# Patient Record
Sex: MALE | Race: WHITE | NOT HISPANIC OR LATINO | Employment: OTHER | URBAN - METROPOLITAN AREA
[De-identification: names, ages, dates, MRNs, and addresses within clinical notes are randomized per-mention and may not be internally consistent; named-entity substitution may affect disease eponyms.]

---

## 2023-03-21 LAB
ALANINE AMINOTRANSFERASE (SGPT) (U/L) IN SER/PLAS: 28 U/L (ref 10–52)
ALBUMIN (G/DL) IN SER/PLAS: 4.4 G/DL (ref 3.4–5)
ALKALINE PHOSPHATASE (U/L) IN SER/PLAS: 68 U/L (ref 33–136)
ASPARTATE AMINOTRANSFERASE (SGOT) (U/L) IN SER/PLAS: 33 U/L (ref 9–39)
BILIRUBIN DIRECT (MG/DL) IN SER/PLAS: 0.3 MG/DL (ref 0–0.3)
BILIRUBIN TOTAL (MG/DL) IN SER/PLAS: 1.3 MG/DL (ref 0–1.2)
CHOLESTEROL (MG/DL) IN SER/PLAS: 142 MG/DL (ref 0–199)
CHOLESTEROL IN HDL (MG/DL) IN SER/PLAS: 79.1 MG/DL
CHOLESTEROL/HDL RATIO: 1.8
CREATINE KINASE (U/L) IN SER/PLAS: 195 U/L (ref 0–325)
LDL: 46 MG/DL (ref 0–99)
PROTEIN TOTAL: 7.3 G/DL (ref 6.4–8.2)
TRIGLYCERIDE (MG/DL) IN SER/PLAS: 85 MG/DL (ref 0–149)
VLDL: 17 MG/DL (ref 0–40)

## 2023-05-17 LAB
ANION GAP IN SER/PLAS: 13 MMOL/L (ref 10–20)
BASOPHILS (10*3/UL) IN BLOOD BY AUTOMATED COUNT: 0.03 X10E9/L (ref 0–0.1)
BASOPHILS/100 LEUKOCYTES IN BLOOD BY AUTOMATED COUNT: 0.4 % (ref 0–2)
CALCIUM (MG/DL) IN SER/PLAS: 10.1 MG/DL (ref 8.6–10.6)
CARBON DIOXIDE, TOTAL (MMOL/L) IN SER/PLAS: 30 MMOL/L (ref 21–32)
CHLORIDE (MMOL/L) IN SER/PLAS: 100 MMOL/L (ref 98–107)
CREATININE (MG/DL) IN SER/PLAS: 1.02 MG/DL (ref 0.5–1.3)
EOSINOPHILS (10*3/UL) IN BLOOD BY AUTOMATED COUNT: 0.09 X10E9/L (ref 0–0.4)
EOSINOPHILS/100 LEUKOCYTES IN BLOOD BY AUTOMATED COUNT: 1.3 % (ref 0–6)
ERYTHROCYTE DISTRIBUTION WIDTH (RATIO) BY AUTOMATED COUNT: 13.2 % (ref 11.5–14.5)
ERYTHROCYTE MEAN CORPUSCULAR HEMOGLOBIN CONCENTRATION (G/DL) BY AUTOMATED: 32.8 G/DL (ref 32–36)
ERYTHROCYTE MEAN CORPUSCULAR VOLUME (FL) BY AUTOMATED COUNT: 96 FL (ref 80–100)
ERYTHROCYTES (10*6/UL) IN BLOOD BY AUTOMATED COUNT: 4.4 X10E12/L (ref 4.5–5.9)
GFR MALE: 72 ML/MIN/1.73M2
GLUCOSE (MG/DL) IN SER/PLAS: 92 MG/DL (ref 74–99)
HEMATOCRIT (%) IN BLOOD BY AUTOMATED COUNT: 42.4 % (ref 41–52)
HEMOGLOBIN (G/DL) IN BLOOD: 13.9 G/DL (ref 13.5–17.5)
IMMATURE GRANULOCYTES/100 LEUKOCYTES IN BLOOD BY AUTOMATED COUNT: 0.3 % (ref 0–0.9)
LEUKOCYTES (10*3/UL) IN BLOOD BY AUTOMATED COUNT: 6.8 X10E9/L (ref 4.4–11.3)
LYMPHOCYTES (10*3/UL) IN BLOOD BY AUTOMATED COUNT: 1.55 X10E9/L (ref 0.8–3)
LYMPHOCYTES/100 LEUKOCYTES IN BLOOD BY AUTOMATED COUNT: 22.9 % (ref 13–44)
MONOCYTES (10*3/UL) IN BLOOD BY AUTOMATED COUNT: 0.8 X10E9/L (ref 0.05–0.8)
MONOCYTES/100 LEUKOCYTES IN BLOOD BY AUTOMATED COUNT: 11.8 % (ref 2–10)
NEUTROPHILS (10*3/UL) IN BLOOD BY AUTOMATED COUNT: 4.27 X10E9/L (ref 1.6–5.5)
NEUTROPHILS/100 LEUKOCYTES IN BLOOD BY AUTOMATED COUNT: 63.3 % (ref 40–80)
NRBC (PER 100 WBCS) BY AUTOMATED COUNT: 0 /100 WBC (ref 0–0)
PLATELETS (10*3/UL) IN BLOOD AUTOMATED COUNT: 229 X10E9/L (ref 150–450)
POTASSIUM (MMOL/L) IN SER/PLAS: 4.2 MMOL/L (ref 3.5–5.3)
SODIUM (MMOL/L) IN SER/PLAS: 139 MMOL/L (ref 136–145)
UREA NITROGEN (MG/DL) IN SER/PLAS: 26 MG/DL (ref 6–23)

## 2023-05-19 ENCOUNTER — HOSPITAL ENCOUNTER (OUTPATIENT)
Dept: DATA CONVERSION | Facility: HOSPITAL | Age: 83
End: 2023-05-19
Attending: INTERNAL MEDICINE | Admitting: INTERNAL MEDICINE
Payer: COMMERCIAL

## 2023-05-19 DIAGNOSIS — I48.91 UNSPECIFIED ATRIAL FIBRILLATION (MULTI): ICD-10-CM

## 2023-05-19 DIAGNOSIS — Z79.01 LONG TERM (CURRENT) USE OF ANTICOAGULANTS: ICD-10-CM

## 2023-05-19 DIAGNOSIS — I48.0 PAROXYSMAL ATRIAL FIBRILLATION (MULTI): ICD-10-CM

## 2023-05-19 DIAGNOSIS — D50.9 IRON DEFICIENCY ANEMIA, UNSPECIFIED: ICD-10-CM

## 2023-05-19 DIAGNOSIS — Z79.82 LONG TERM (CURRENT) USE OF ASPIRIN: ICD-10-CM

## 2023-05-19 DIAGNOSIS — Z87.891 PERSONAL HISTORY OF NICOTINE DEPENDENCE: ICD-10-CM

## 2023-05-19 DIAGNOSIS — E78.5 HYPERLIPIDEMIA, UNSPECIFIED: ICD-10-CM

## 2023-05-19 DIAGNOSIS — I10 ESSENTIAL (PRIMARY) HYPERTENSION: ICD-10-CM

## 2023-05-19 DIAGNOSIS — J43.9 EMPHYSEMA, UNSPECIFIED (MULTI): ICD-10-CM

## 2023-05-25 LAB
ATRIAL RATE: 300 BPM
ATRIAL RATE: 62 BPM
P AXIS: 78 DEGREES
P OFFSET: 185 MS
P ONSET: 140 MS
PR INTERVAL: 170 MS
Q ONSET: 225 MS
Q ONSET: 226 MS
QRS COUNT: 10 BEATS
QRS COUNT: 17 BEATS
QRS DURATION: 88 MS
QRS DURATION: 88 MS
QT INTERVAL: 314 MS
QT INTERVAL: 430 MS
QTC CALCULATION(BAZETT): 411 MS
QTC CALCULATION(BAZETT): 436 MS
QTC FREDERICIA: 376 MS
QTC FREDERICIA: 435 MS
R AXIS: 21 DEGREES
R AXIS: 48 DEGREES
T AXIS: 60 DEGREES
T AXIS: 71 DEGREES
T OFFSET: 383 MS
T OFFSET: 440 MS
VENTRICULAR RATE: 103 BPM
VENTRICULAR RATE: 62 BPM

## 2023-07-20 ENCOUNTER — TELEPHONE (OUTPATIENT)
Dept: PRIMARY CARE | Facility: CLINIC | Age: 83
End: 2023-07-20
Payer: COMMERCIAL

## 2023-07-20 DIAGNOSIS — J42 CHRONIC BRONCHITIS, UNSPECIFIED CHRONIC BRONCHITIS TYPE (MULTI): Primary | ICD-10-CM

## 2023-07-20 RX ORDER — ALBUTEROL SULFATE 90 UG/1
2 AEROSOL, METERED RESPIRATORY (INHALATION) EVERY 4 HOURS PRN
COMMUNITY
End: 2023-07-20 | Stop reason: SDUPTHER

## 2023-07-20 RX ORDER — ALBUTEROL SULFATE 90 UG/1
2 AEROSOL, METERED RESPIRATORY (INHALATION) EVERY 4 HOURS PRN
Qty: 18 G | Refills: 3 | Status: SHIPPED | OUTPATIENT
Start: 2023-07-20

## 2023-07-20 NOTE — TELEPHONE ENCOUNTER
Rx Refill Request Telephone Encounter    Name:  Connor Gutierres  :  [id number]  Medication Name:  stiolto respimat  Dose : 2.5-2.5  Route : inhalent   Directions : inhail two puffs every 24 hours  Specific Pharmacy location:  [facility] [address]  Best number to reach patient:  [phone]

## 2024-03-14 ENCOUNTER — TELEPHONE (OUTPATIENT)
Dept: PRIMARY CARE | Facility: CLINIC | Age: 84
End: 2024-03-14
Payer: COMMERCIAL

## 2024-03-14 DIAGNOSIS — R06.00 DYSPNEA, UNSPECIFIED TYPE: Primary | ICD-10-CM

## 2024-03-14 NOTE — TELEPHONE ENCOUNTER
Rx Refill Request Telephone Encounter    Name:  Connor Gutierres  :  [id number]  Medication Name:  stiolto Respimat  Dose : 2.5 mcg/2.5 mcg  Directions : inhale 2 puffs every 24 hours.   Specific Pharmacy location:  [facility]  Best number to reach patient:  [phone]

## 2024-03-18 NOTE — TELEPHONE ENCOUNTER
Patient would like to know if you can call in a alternative for Ipratropium-albuterol ? He doesn't believe that it is helping and also a little costly.

## 2024-03-21 ENCOUNTER — OFFICE VISIT (OUTPATIENT)
Dept: PRIMARY CARE | Facility: CLINIC | Age: 84
End: 2024-03-21
Payer: COMMERCIAL

## 2024-03-21 VITALS
BODY MASS INDEX: 19.33 KG/M2 | HEART RATE: 130 BPM | HEIGHT: 70 IN | OXYGEN SATURATION: 93 % | DIASTOLIC BLOOD PRESSURE: 72 MMHG | TEMPERATURE: 98 F | WEIGHT: 135 LBS | SYSTOLIC BLOOD PRESSURE: 109 MMHG

## 2024-03-21 DIAGNOSIS — J42 CHRONIC BRONCHITIS, UNSPECIFIED CHRONIC BRONCHITIS TYPE (MULTI): Primary | ICD-10-CM

## 2024-03-21 PROCEDURE — 1159F MED LIST DOCD IN RCRD: CPT | Performed by: FAMILY MEDICINE

## 2024-03-21 PROCEDURE — 99214 OFFICE O/P EST MOD 30 MIN: CPT | Performed by: FAMILY MEDICINE

## 2024-03-21 PROCEDURE — 1160F RVW MEDS BY RX/DR IN RCRD: CPT | Performed by: FAMILY MEDICINE

## 2024-03-21 PROCEDURE — 1036F TOBACCO NON-USER: CPT | Performed by: FAMILY MEDICINE

## 2024-03-21 RX ORDER — ROSUVASTATIN CALCIUM 20 MG/1
TABLET, COATED ORAL
COMMUNITY
Start: 2022-02-01

## 2024-03-21 RX ORDER — TIOTROPIUM BROMIDE AND OLODATEROL 3.124; 2.736 UG/1; UG/1
SPRAY, METERED RESPIRATORY (INHALATION)
COMMUNITY
Start: 2022-01-19 | End: 2024-03-21 | Stop reason: SDUPTHER

## 2024-03-21 RX ORDER — LOSARTAN POTASSIUM AND HYDROCHLOROTHIAZIDE 12.5; 5 MG/1; MG/1
TABLET ORAL
COMMUNITY
Start: 2022-05-04

## 2024-03-21 RX ORDER — TIOTROPIUM BROMIDE AND OLODATEROL 3.124; 2.736 UG/1; UG/1
2 SPRAY, METERED RESPIRATORY (INHALATION) DAILY
Qty: 2 G | Refills: 3 | Status: SHIPPED | OUTPATIENT
Start: 2024-03-21 | End: 2024-05-20

## 2024-03-21 RX ORDER — NITROGLYCERIN 0.4 MG/1
TABLET SUBLINGUAL
COMMUNITY
Start: 2020-10-16

## 2024-03-21 RX ORDER — NAPROXEN SODIUM 220 MG/1
TABLET, FILM COATED ORAL
COMMUNITY
Start: 2022-07-12

## 2024-03-21 RX ORDER — APIXABAN 2.5 MG/1
TABLET, FILM COATED ORAL
COMMUNITY

## 2024-03-21 RX ORDER — METOPROLOL TARTRATE 25 MG/1
TABLET, FILM COATED ORAL
COMMUNITY
Start: 2017-06-19

## 2024-03-21 ASSESSMENT — PATIENT HEALTH QUESTIONNAIRE - PHQ9
2. FEELING DOWN, DEPRESSED OR HOPELESS: NOT AT ALL
1. LITTLE INTEREST OR PLEASURE IN DOING THINGS: NOT AT ALL
SUM OF ALL RESPONSES TO PHQ9 QUESTIONS 1 AND 2: 0

## 2024-03-21 ASSESSMENT — COLUMBIA-SUICIDE SEVERITY RATING SCALE - C-SSRS
1. IN THE PAST MONTH, HAVE YOU WISHED YOU WERE DEAD OR WISHED YOU COULD GO TO SLEEP AND NOT WAKE UP?: NO
2. HAVE YOU ACTUALLY HAD ANY THOUGHTS OF KILLING YOURSELF?: NO
6. HAVE YOU EVER DONE ANYTHING, STARTED TO DO ANYTHING, OR PREPARED TO DO ANYTHING TO END YOUR LIFE?: NO

## 2024-03-21 ASSESSMENT — ENCOUNTER SYMPTOMS
COUGH: 1
SHORTNESS OF BREATH: 1

## 2024-03-21 NOTE — PROGRESS NOTES
"Subjective   Patient ID: Connor Gutierres is a 83 y.o. male who presents for Med Refill (Pt is here for medication refill ).    COPD, needs inhaler refilled.    Med Refill  Associated symptoms include coughing.        Review of Systems   Respiratory:  Positive for cough and shortness of breath.    All other systems reviewed and are negative.      Objective   /72   Pulse (!) 130   Temp 36.7 °C (98 °F)   Ht 1.778 m (5' 10\")   Wt 61.2 kg (135 lb)   SpO2 93%   BMI 19.37 kg/m²     Physical Exam  Vitals and nursing note reviewed.   Constitutional:       Appearance: Normal appearance.   HENT:      Head: Normocephalic and atraumatic.      Nose: Nose normal.      Mouth/Throat:      Mouth: Mucous membranes are moist.      Pharynx: Oropharynx is clear.   Eyes:      Extraocular Movements: Extraocular movements intact.      Conjunctiva/sclera: Conjunctivae normal.      Pupils: Pupils are equal, round, and reactive to light.   Cardiovascular:      Rate and Rhythm: Normal rate and regular rhythm.      Pulses: Normal pulses.   Pulmonary:      Effort: Pulmonary effort is normal.      Breath sounds: Wheezing present.   Abdominal:      General: Bowel sounds are normal.      Palpations: Abdomen is soft.   Musculoskeletal:         General: Normal range of motion.      Cervical back: Normal range of motion and neck supple.   Skin:     General: Skin is warm and dry.      Capillary Refill: Capillary refill takes less than 2 seconds.   Neurological:      General: No focal deficit present.      Mental Status: He is alert.   Psychiatric:         Mood and Affect: Mood normal.         Behavior: Behavior normal.         Thought Content: Thought content normal.         Judgment: Judgment normal.         Assessment/Plan          "

## 2024-03-25 ENCOUNTER — LAB (OUTPATIENT)
Dept: LAB | Facility: LAB | Age: 84
End: 2024-03-25
Payer: COMMERCIAL

## 2024-03-25 DIAGNOSIS — I10 ESSENTIAL (PRIMARY) HYPERTENSION: ICD-10-CM

## 2024-03-25 DIAGNOSIS — I70.211 ATHEROSCLEROSIS OF NATIVE ARTERIES OF EXTREMITIES WITH INTERMITTENT CLAUDICATION, RIGHT LEG (CMS-HCC): Primary | ICD-10-CM

## 2024-03-25 DIAGNOSIS — E78.5 HYPERLIPIDEMIA, UNSPECIFIED: ICD-10-CM

## 2024-03-25 PROCEDURE — 36415 COLL VENOUS BLD VENIPUNCTURE: CPT

## 2024-03-25 PROCEDURE — 80053 COMPREHEN METABOLIC PANEL: CPT

## 2024-03-25 PROCEDURE — 80061 LIPID PANEL: CPT

## 2024-03-26 ENCOUNTER — LAB (OUTPATIENT)
Dept: LAB | Facility: LAB | Age: 84
End: 2024-03-26
Payer: COMMERCIAL

## 2024-03-26 DIAGNOSIS — R53.83 OTHER FATIGUE: ICD-10-CM

## 2024-03-26 DIAGNOSIS — R06.02 SHORTNESS OF BREATH: Primary | ICD-10-CM

## 2024-03-26 DIAGNOSIS — R42 DIZZINESS AND GIDDINESS: ICD-10-CM

## 2024-03-26 LAB
ALBUMIN SERPL BCP-MCNC: 4 G/DL (ref 3.4–5)
ALP SERPL-CCNC: 74 U/L (ref 33–136)
ALT SERPL W P-5'-P-CCNC: 31 U/L (ref 10–52)
ANION GAP SERPL CALC-SCNC: 15 MMOL/L (ref 10–20)
AST SERPL W P-5'-P-CCNC: 35 U/L (ref 9–39)
BASOPHILS # BLD AUTO: 0.04 X10*3/UL (ref 0–0.1)
BASOPHILS NFR BLD AUTO: 0.6 %
BILIRUB SERPL-MCNC: 0.5 MG/DL (ref 0–1.2)
BUN SERPL-MCNC: 37 MG/DL (ref 6–23)
CALCIUM SERPL-MCNC: 9.2 MG/DL (ref 8.6–10.6)
CHLORIDE SERPL-SCNC: 101 MMOL/L (ref 98–107)
CHOLEST SERPL-MCNC: 105 MG/DL (ref 0–199)
CHOLESTEROL/HDL RATIO: 2.1
CO2 SERPL-SCNC: 28 MMOL/L (ref 21–32)
CREAT SERPL-MCNC: 1.2 MG/DL (ref 0.5–1.3)
EGFRCR SERPLBLD CKD-EPI 2021: 59 ML/MIN/1.73M*2
EOSINOPHIL # BLD AUTO: 0.21 X10*3/UL (ref 0–0.4)
EOSINOPHIL NFR BLD AUTO: 3.4 %
ERYTHROCYTE [DISTWIDTH] IN BLOOD BY AUTOMATED COUNT: 12.9 % (ref 11.5–14.5)
GLUCOSE SERPL-MCNC: 79 MG/DL (ref 74–99)
HCT VFR BLD AUTO: 38.5 % (ref 41–52)
HDLC SERPL-MCNC: 50.7 MG/DL
HGB BLD-MCNC: 12.7 G/DL (ref 13.5–17.5)
IMM GRANULOCYTES # BLD AUTO: 0.03 X10*3/UL (ref 0–0.5)
IMM GRANULOCYTES NFR BLD AUTO: 0.5 % (ref 0–0.9)
LDLC SERPL CALC-MCNC: 37 MG/DL
LYMPHOCYTES # BLD AUTO: 1.83 X10*3/UL (ref 0.8–3)
LYMPHOCYTES NFR BLD AUTO: 29.5 %
MCH RBC QN AUTO: 31.9 PG (ref 26–34)
MCHC RBC AUTO-ENTMCNC: 33 G/DL (ref 32–36)
MCV RBC AUTO: 97 FL (ref 80–100)
MONOCYTES # BLD AUTO: 0.71 X10*3/UL (ref 0.05–0.8)
MONOCYTES NFR BLD AUTO: 11.5 %
NEUTROPHILS # BLD AUTO: 3.38 X10*3/UL (ref 1.6–5.5)
NEUTROPHILS NFR BLD AUTO: 54.5 %
NON HDL CHOLESTEROL: 54 MG/DL (ref 0–149)
NRBC BLD-RTO: 0 /100 WBCS (ref 0–0)
PLATELET # BLD AUTO: 293 X10*3/UL (ref 150–450)
POTASSIUM SERPL-SCNC: 4.8 MMOL/L (ref 3.5–5.3)
PROT SERPL-MCNC: 6.7 G/DL (ref 6.4–8.2)
RBC # BLD AUTO: 3.98 X10*6/UL (ref 4.5–5.9)
SODIUM SERPL-SCNC: 139 MMOL/L (ref 136–145)
TRIGL SERPL-MCNC: 87 MG/DL (ref 0–149)
VLDL: 17 MG/DL (ref 0–40)
WBC # BLD AUTO: 6.2 X10*3/UL (ref 4.4–11.3)

## 2024-03-26 PROCEDURE — 85025 COMPLETE CBC W/AUTO DIFF WBC: CPT

## 2024-03-26 PROCEDURE — 36415 COLL VENOUS BLD VENIPUNCTURE: CPT

## 2024-11-19 PROBLEM — J43.9 EMPHYSEMA LUNG (MULTI): Status: ACTIVE | Noted: 2021-10-14

## 2024-11-19 PROBLEM — I25.10 CAD (CORONARY ARTERY DISEASE): Status: ACTIVE | Noted: 2024-11-19

## 2024-11-19 PROBLEM — D50.9 IRON DEFICIENCY ANEMIA: Status: ACTIVE | Noted: 2024-11-19

## 2024-11-19 PROBLEM — E78.5 HYPERLIPIDEMIA: Status: ACTIVE | Noted: 2017-08-31

## 2024-11-19 PROBLEM — I27.21 PULMONARY ARTERIAL HYPERTENSION (MULTI): Status: ACTIVE | Noted: 2017-04-10

## 2024-11-19 PROBLEM — I10 ESSENTIAL HYPERTENSION: Status: ACTIVE | Noted: 2017-04-10

## 2024-11-19 PROBLEM — I48.0 PAROXYSMAL ATRIAL FIBRILLATION (MULTI): Status: ACTIVE | Noted: 2017-04-10

## 2024-11-20 ENCOUNTER — LAB (OUTPATIENT)
Dept: LAB | Facility: LAB | Age: 84
End: 2024-11-20
Payer: COMMERCIAL

## 2024-11-20 ENCOUNTER — OFFICE VISIT (OUTPATIENT)
Dept: PRIMARY CARE | Facility: CLINIC | Age: 84
End: 2024-11-20
Payer: COMMERCIAL

## 2024-11-20 ENCOUNTER — HOSPITAL ENCOUNTER (OUTPATIENT)
Dept: RADIOLOGY | Facility: HOSPITAL | Age: 84
Discharge: HOME | End: 2024-11-20
Payer: COMMERCIAL

## 2024-11-20 VITALS
WEIGHT: 137 LBS | BODY MASS INDEX: 19.66 KG/M2 | OXYGEN SATURATION: 98 % | HEART RATE: 44 BPM | SYSTOLIC BLOOD PRESSURE: 126 MMHG | DIASTOLIC BLOOD PRESSURE: 78 MMHG | TEMPERATURE: 97.3 F

## 2024-11-20 DIAGNOSIS — I48.0 PAROXYSMAL ATRIAL FIBRILLATION (MULTI): ICD-10-CM

## 2024-11-20 DIAGNOSIS — J43.9 PULMONARY EMPHYSEMA, UNSPECIFIED EMPHYSEMA TYPE (MULTI): ICD-10-CM

## 2024-11-20 DIAGNOSIS — I73.9 PAD (PERIPHERAL ARTERY DISEASE) (CMS-HCC): ICD-10-CM

## 2024-11-20 DIAGNOSIS — I10 ESSENTIAL HYPERTENSION: ICD-10-CM

## 2024-11-20 DIAGNOSIS — E78.5 HYPERLIPIDEMIA, UNSPECIFIED HYPERLIPIDEMIA TYPE: ICD-10-CM

## 2024-11-20 DIAGNOSIS — I10 ESSENTIAL HYPERTENSION: Primary | ICD-10-CM

## 2024-11-20 DIAGNOSIS — M54.2 NECK PAIN, ACUTE: ICD-10-CM

## 2024-11-20 DIAGNOSIS — I25.10 CORONARY ARTERY DISEASE INVOLVING NATIVE CORONARY ARTERY OF NATIVE HEART, UNSPECIFIED WHETHER ANGINA PRESENT: ICD-10-CM

## 2024-11-20 DIAGNOSIS — E55.9 VITAMIN D DEFICIENCY: ICD-10-CM

## 2024-11-20 LAB
25(OH)D3 SERPL-MCNC: 55 NG/ML (ref 30–100)
ALBUMIN SERPL BCP-MCNC: 4.4 G/DL (ref 3.4–5)
ALP SERPL-CCNC: 66 U/L (ref 33–136)
ALT SERPL W P-5'-P-CCNC: 27 U/L (ref 10–52)
ANION GAP SERPL CALC-SCNC: 11 MMOL/L (ref 10–20)
AST SERPL W P-5'-P-CCNC: 37 U/L (ref 9–39)
BASOPHILS # BLD AUTO: 0.03 X10*3/UL (ref 0–0.1)
BASOPHILS NFR BLD AUTO: 0.5 %
BILIRUB SERPL-MCNC: 0.8 MG/DL (ref 0–1.2)
BUN SERPL-MCNC: 27 MG/DL (ref 6–23)
CALCIUM SERPL-MCNC: 9.8 MG/DL (ref 8.6–10.3)
CHLORIDE SERPL-SCNC: 103 MMOL/L (ref 98–107)
CO2 SERPL-SCNC: 28 MMOL/L (ref 21–32)
CREAT SERPL-MCNC: 1.08 MG/DL (ref 0.5–1.3)
EGFRCR SERPLBLD CKD-EPI 2021: 67 ML/MIN/1.73M*2
EOSINOPHIL # BLD AUTO: 0.12 X10*3/UL (ref 0–0.4)
EOSINOPHIL NFR BLD AUTO: 1.8 %
ERYTHROCYTE [DISTWIDTH] IN BLOOD BY AUTOMATED COUNT: 13.2 % (ref 11.5–14.5)
GLUCOSE SERPL-MCNC: 99 MG/DL (ref 74–99)
HCT VFR BLD AUTO: 40.7 % (ref 41–52)
HGB BLD-MCNC: 13.1 G/DL (ref 13.5–17.5)
IMM GRANULOCYTES # BLD AUTO: 0.03 X10*3/UL (ref 0–0.5)
IMM GRANULOCYTES NFR BLD AUTO: 0.5 % (ref 0–0.9)
LYMPHOCYTES # BLD AUTO: 1.5 X10*3/UL (ref 0.8–3)
LYMPHOCYTES NFR BLD AUTO: 22.6 %
MAGNESIUM SERPL-MCNC: 1.91 MG/DL (ref 1.6–2.4)
MCH RBC QN AUTO: 31.9 PG (ref 26–34)
MCHC RBC AUTO-ENTMCNC: 32.2 G/DL (ref 32–36)
MCV RBC AUTO: 99 FL (ref 80–100)
MONOCYTES # BLD AUTO: 0.8 X10*3/UL (ref 0.05–0.8)
MONOCYTES NFR BLD AUTO: 12.1 %
NEUTROPHILS # BLD AUTO: 4.15 X10*3/UL (ref 1.6–5.5)
NEUTROPHILS NFR BLD AUTO: 62.5 %
NRBC BLD-RTO: 0 /100 WBCS (ref 0–0)
PLATELET # BLD AUTO: 214 X10*3/UL (ref 150–450)
POTASSIUM SERPL-SCNC: 4.3 MMOL/L (ref 3.5–5.3)
PROT SERPL-MCNC: 7 G/DL (ref 6.4–8.2)
RBC # BLD AUTO: 4.11 X10*6/UL (ref 4.5–5.9)
SODIUM SERPL-SCNC: 138 MMOL/L (ref 136–145)
TSH SERPL-ACNC: 1.19 MIU/L (ref 0.44–3.98)
VIT B12 SERPL-MCNC: 385 PG/ML (ref 211–911)
WBC # BLD AUTO: 6.6 X10*3/UL (ref 4.4–11.3)

## 2024-11-20 PROCEDURE — 82306 VITAMIN D 25 HYDROXY: CPT

## 2024-11-20 PROCEDURE — 99214 OFFICE O/P EST MOD 30 MIN: CPT | Performed by: INTERNAL MEDICINE

## 2024-11-20 PROCEDURE — 3078F DIAST BP <80 MM HG: CPT | Performed by: INTERNAL MEDICINE

## 2024-11-20 PROCEDURE — 1036F TOBACCO NON-USER: CPT | Performed by: INTERNAL MEDICINE

## 2024-11-20 PROCEDURE — 80053 COMPREHEN METABOLIC PANEL: CPT

## 2024-11-20 PROCEDURE — 1160F RVW MEDS BY RX/DR IN RCRD: CPT | Performed by: INTERNAL MEDICINE

## 2024-11-20 PROCEDURE — 83735 ASSAY OF MAGNESIUM: CPT

## 2024-11-20 PROCEDURE — 1159F MED LIST DOCD IN RCRD: CPT | Performed by: INTERNAL MEDICINE

## 2024-11-20 PROCEDURE — 82607 VITAMIN B-12: CPT

## 2024-11-20 PROCEDURE — 72050 X-RAY EXAM NECK SPINE 4/5VWS: CPT

## 2024-11-20 PROCEDURE — 36415 COLL VENOUS BLD VENIPUNCTURE: CPT

## 2024-11-20 PROCEDURE — 85025 COMPLETE CBC W/AUTO DIFF WBC: CPT

## 2024-11-20 PROCEDURE — 84443 ASSAY THYROID STIM HORMONE: CPT

## 2024-11-20 PROCEDURE — 72050 X-RAY EXAM NECK SPINE 4/5VWS: CPT | Performed by: RADIOLOGY

## 2024-11-20 PROCEDURE — 3074F SYST BP LT 130 MM HG: CPT | Performed by: INTERNAL MEDICINE

## 2024-11-20 ASSESSMENT — ENCOUNTER SYMPTOMS
TROUBLE SWALLOWING: 0
EYE DISCHARGE: 0
ROS GI COMMENTS: HIATAL HERNIA
FLANK PAIN: 0
EYE PAIN: 0
CONSTIPATION: 0
FATIGUE: 1
SLEEP DISTURBANCE: 0
HEMATURIA: 0
NECK PAIN: 1
WEAKNESS: 0
BLOOD IN STOOL: 0
ABDOMINAL PAIN: 0
FREQUENCY: 0
DIZZINESS: 0
CONFUSION: 0
BACK PAIN: 0
CHILLS: 0
JOINT SWELLING: 0
VOMITING: 0
NAUSEA: 0
DIARRHEA: 0
NUMBNESS: 1
WOUND: 0
NERVOUS/ANXIOUS: 0
SEIZURES: 0
FEVER: 0
APPETITE CHANGE: 0
SORE THROAT: 0
WHEEZING: 0
SHORTNESS OF BREATH: 0
UNEXPECTED WEIGHT CHANGE: 0
HEADACHES: 0
TREMORS: 0
PALPITATIONS: 0
DYSURIA: 0
COUGH: 0

## 2024-11-20 ASSESSMENT — PATIENT HEALTH QUESTIONNAIRE - PHQ9
SUM OF ALL RESPONSES TO PHQ9 QUESTIONS 1 AND 2: 0
2. FEELING DOWN, DEPRESSED OR HOPELESS: NOT AT ALL
1. LITTLE INTEREST OR PLEASURE IN DOING THINGS: NOT AT ALL

## 2024-11-20 NOTE — ASSESSMENT & PLAN NOTE
- chronic  s/p MI, s/p 2 stents   - taking daily ASA 81 mg  - no CP reported   - no recent ECHO but yearly EKGs per [facility] Cardiologist  Orders:    Referral to Vascular Medicine; Future

## 2024-11-20 NOTE — ASSESSMENT & PLAN NOTE
- intermittent  - on BB , NOAC   Orders:    TSH with reflex to Free T4 if abnormal; Future    Magnesium; Future

## 2024-11-20 NOTE — ASSESSMENT & PLAN NOTE
- chronic, BP controlled, in BB, ARB/HCTZ   - HR  varies between 40-49- asymptomatic at visit - pt stated DR Villa likes his HR to be low - pt will call cardio today to convey this findings and seek advice  Orders:    Comprehensive Metabolic Panel; Future    CBC and Auto Differential; Future

## 2024-11-20 NOTE — ASSESSMENT & PLAN NOTE
- chronic, stable, no flare-ups  - former 60 yrs smoker  - uses rescue INH very seldom, but Stiolto daily in am   - cxr as above- demonstrated emphysema

## 2024-11-20 NOTE — PROGRESS NOTES
"Subjective   Patient ID: Connor Gutierres is a 83 y.o. male who presents for Establish Care (Neck problem for 3 weeks./Numbness's and tingling in the right side of neck./Possible afib/Numbness and tingling in left leg.).    HPI   NEW PT ( Former Dr Finley )  Here to establish.   Chart reviewed, PMHX, meds,  Social HX- updated at visit   Labs( 3/26/24 - GFR 59, lipids OK ) and imaging( CXR 10/2021- emphysema) reviewed      Cardiology DR Bigg Villa- last week (?)( no notes in EPIC)  ( 05/06/24 12:06:00   Cardiac Stress Test   08/04/2022 - inferior scar w mod ischemia   05/06/24 12:06:00   Cardiac Echocardiogram   11/09/2021 - EF 60-65% )    Vascular - had procedure 2 years ago- and was told \" blockage still there\"- unable to see in Epic    Review of Systems   Constitutional:  Positive for fatigue. Negative for appetite change, chills, fever and unexpected weight change.   HENT:  Negative for congestion, ear pain, sneezing, sore throat and trouble swallowing.    Eyes:  Negative for pain, discharge and visual disturbance.   Respiratory:  Negative for cough, shortness of breath and wheezing.    Cardiovascular:  Negative for chest pain, palpitations and leg swelling.        \"Burning\" Claudication after 20 steps    Gastrointestinal:  Negative for abdominal pain, blood in stool, constipation, diarrhea, nausea and vomiting.        Hiatal hernia   Genitourinary:  Negative for dysuria, flank pain, frequency, hematuria and urgency.   Musculoskeletal:  Positive for neck pain (x  3 weeks). Negative for back pain, gait problem and joint swelling.   Skin:  Negative for rash and wound.   Neurological:  Positive for numbness (tingling neck x 2 weeks). Negative for dizziness, tremors, seizures, syncope, weakness and headaches.   Psychiatric/Behavioral:  Negative for confusion, sleep disturbance and suicidal ideas. The patient is not nervous/anxious.        Objective   /78   Pulse (!) 44   Temp 36.3 °C (97.3 °F)   Wt 62.1 kg " (137 lb)   SpO2 98%   BMI 19.66 kg/m²   Patient Health Questionnaire-2 Score: 0      Physical Exam  Vitals and nursing note reviewed.   Constitutional:       General: He is not in acute distress.     Appearance: Normal appearance.   HENT:      Head: Normocephalic and atraumatic.      Nose: Nose normal.      Mouth/Throat:      Mouth: Mucous membranes are moist.      Pharynx: Oropharynx is clear.   Eyes:      Extraocular Movements: Extraocular movements intact.      Conjunctiva/sclera: Conjunctivae normal.      Pupils: Pupils are equal, round, and reactive to light.   Cardiovascular:      Rate and Rhythm: Regular rhythm. Bradycardia present.      Heart sounds: No murmur heard.  Pulmonary:      Effort: Pulmonary effort is normal. No respiratory distress.      Breath sounds: Normal breath sounds. No wheezing or rhonchi.   Abdominal:      General: Bowel sounds are normal.      Palpations: Abdomen is soft.      Tenderness: There is no abdominal tenderness.   Musculoskeletal:         General: Normal range of motion.      Cervical back: Neck supple. Tenderness (Rt  sided) present. No swelling or deformity. Normal range of motion.      Right lower leg: No edema.      Left lower leg: No edema.   Skin:     General: Skin is warm and dry.      Findings: No bruising or rash.   Neurological:      General: No focal deficit present.      Mental Status: He is alert and oriented to person, place, and time.      Motor: No weakness.      Gait: Gait normal.   Psychiatric:         Mood and Affect: Mood normal.         Behavior: Behavior normal.       Assessment/Plan   Assessment & Plan  Essential hypertension  - chronic, BP controlled, in BB, ARB/HCTZ   - HR  varies between 40-49- asymptomatic at visit - pt stated DR Villa likes his HR to be low - pt will call cardio today to convey this findings and seek advice  Orders:    Comprehensive Metabolic Panel; Future    CBC and Auto Differential; Future    Neck pain, acute  - acute, x 3  weeks, taking PRN IBU and using heating pad up to 20 min, has paresthesia RT sided neck- likely DDD with radiculopathy, NO headaches  Orders:    XR cervical spine 2-3 views; Future    Pulmonary emphysema, unspecified emphysema type (Multi)  - chronic, stable, no flare-ups  - former 60 yrs smoker  - uses rescue INH very seldom, but Stiolto daily in am   - cxr as above- demonstrated emphysema        Coronary artery disease involving native coronary artery of native heart, unspecified whether angina present  - chronic  s/p MI, s/p 2 stents   - taking daily ASA 81 mg  - no CP reported   - no recent ECHO but yearly EKGs per [facility] Cardiologist  Orders:    Referral to Vascular Medicine; Future    Paroxysmal atrial fibrillation (Multi)  - intermittent  - on BB , NOAC   Orders:    TSH with reflex to Free T4 if abnormal; Future    Magnesium; Future    PAD (peripheral artery disease) (CMS-HCC)  - chronic, h/o unknown leg procedure about 2 years ago, for a blockage that did not go away after that (per pt)  - pt reports claudication   - agrees with vascular medicine referral  Orders:    Vitamin B12; Future    Referral to Vascular Medicine; Future    Hyperlipidemia, unspecified hyperlipidemia type  - chronic and controlled on current dose statin        Vitamin D deficiency  - currently takes daily unknown dose vit D oTC   Orders:    Vitamin D 25-Hydroxy,Total (for eval of Vitamin D levels); Future    F/u 3 months

## 2024-11-23 ENCOUNTER — PATIENT MESSAGE (OUTPATIENT)
Dept: PRIMARY CARE | Facility: CLINIC | Age: 84
End: 2024-11-23
Payer: COMMERCIAL

## 2024-11-25 ENCOUNTER — HOSPITAL ENCOUNTER (OUTPATIENT)
Dept: VASCULAR MEDICINE | Facility: HOSPITAL | Age: 84
Discharge: HOME | End: 2024-11-25
Payer: COMMERCIAL

## 2024-11-25 ENCOUNTER — OFFICE VISIT (OUTPATIENT)
Dept: CARDIOLOGY | Facility: HOSPITAL | Age: 84
End: 2024-11-25
Payer: COMMERCIAL

## 2024-11-25 VITALS
RESPIRATION RATE: 18 BRPM | HEART RATE: 71 BPM | HEIGHT: 70 IN | WEIGHT: 135.3 LBS | SYSTOLIC BLOOD PRESSURE: 178 MMHG | DIASTOLIC BLOOD PRESSURE: 62 MMHG | OXYGEN SATURATION: 97 % | BODY MASS INDEX: 19.37 KG/M2

## 2024-11-25 DIAGNOSIS — R09.89 ABSENT PULSE IN LOWER EXTREMITY: ICD-10-CM

## 2024-11-25 DIAGNOSIS — I73.9 PAD (PERIPHERAL ARTERY DISEASE) (CMS-HCC): ICD-10-CM

## 2024-11-25 DIAGNOSIS — I25.10 CORONARY ARTERY DISEASE INVOLVING NATIVE CORONARY ARTERY OF NATIVE HEART, UNSPECIFIED WHETHER ANGINA PRESENT: ICD-10-CM

## 2024-11-25 DIAGNOSIS — I48.0 PAROXYSMAL ATRIAL FIBRILLATION (MULTI): Primary | ICD-10-CM

## 2024-11-25 PROBLEM — Z98.62 H/O ANGIOPLASTY: Status: ACTIVE | Noted: 2024-11-25

## 2024-11-25 PROBLEM — Z95.5 S/P CORONARY ARTERY STENT PLACEMENT: Status: ACTIVE | Noted: 2024-11-25

## 2024-11-25 PROCEDURE — 93924 LWR XTR VASC STDY BILAT: CPT | Performed by: STUDENT IN AN ORGANIZED HEALTH CARE EDUCATION/TRAINING PROGRAM

## 2024-11-25 PROCEDURE — 3078F DIAST BP <80 MM HG: CPT | Performed by: INTERNAL MEDICINE

## 2024-11-25 PROCEDURE — 99214 OFFICE O/P EST MOD 30 MIN: CPT | Mod: 25 | Performed by: INTERNAL MEDICINE

## 2024-11-25 PROCEDURE — 1036F TOBACCO NON-USER: CPT | Performed by: INTERNAL MEDICINE

## 2024-11-25 PROCEDURE — 93924 LWR XTR VASC STDY BILAT: CPT

## 2024-11-25 PROCEDURE — 99204 OFFICE O/P NEW MOD 45 MIN: CPT | Performed by: INTERNAL MEDICINE

## 2024-11-25 PROCEDURE — 1159F MED LIST DOCD IN RCRD: CPT | Performed by: INTERNAL MEDICINE

## 2024-11-25 PROCEDURE — 3077F SYST BP >= 140 MM HG: CPT | Performed by: INTERNAL MEDICINE

## 2024-11-25 NOTE — LETTER
"November 25, 2024     Berkley Ramirez MD  [address] [address]    Patient: Connor Gutierres   YOB: 1940   Date of Visit: 11/25/2024       Dear Dr. Berkley Ramirez MD:    Thank you for referring Connor Gutierres to me for evaluation. Below are my notes for this consultation.  If you have questions, please do not hesitate to call me. I look forward to following your patient along with you.       Sincerely,     Linda Camarillo MD      CC: Bigg Villa MD  ______________________________________________________________________________________    11/25/24  11:43 AM    Vascular Medicine    Mr. Gutierres is a 83 y.o. man referred for claudication by Dr. Ramirez.    He reports that ~3 years ago he began noticing progressive burning in his legs with walking.  He also has an irregular pulse.    This led to cardiac and vascular work up at [facility]. He was dx with AFIB and underwent stress testing with inferior MI/ischemia followed by PCI of the RCA by Dr. Villa.    He was referred to Dr. Dixon and underwent the following procedure on 1.31.2022:  Abdominal Aortogram with bilateral extremity angiograms. 1/31/22     Completed   Atherectomy-Femoral Popliteal Artery. 1/31/22     Completed   DCB Percutaneous Transluminal Angioplasty-Femoral Popliteal Artery. 1/31/22     Completed     He reports that he had no improvement of his sx post intervention. He was frustrated as it was a prolonged procedure.  He has continued to \"live with\" his leg sx and has adapted. He was not offered rehab before intervention.    He can currently walk ~ 20 steps without pain, then both thighs burn. He has to stop after 100 steps as legs get weak. He notices in the morning his right foot is pale. He has not had any ulcerations and denies pain at rest.    He does have some balance issues. He has neck pain.   From a cardiac perspective he has no chest pain or dyspnea and feels that his 2 coronary stents helped him.  He is " on Eliquis 2.5 mg BID and aspirin (weight just over 60kg and age 83 years).  Also on medications for BP control and lipid lowering Rx. He has no DM. He smoked in the past, quite years ago.    Patient Active Problem List   Diagnosis   • CAD (coronary artery disease)   • Emphysema lung (Multi)   • Essential hypertension   • Hyperlipidemia   • Iron deficiency anemia   • Paroxysmal atrial fibrillation (Multi)   • Pulmonary arterial hypertension (Multi)   • PAD (peripheral artery disease) (CMS-HCC)     Past Surgical History:   Procedure Laterality Date   • CHOLECYSTECTOMY  09/24/2018    Cholecystectomy   • HERNIA REPAIR  09/24/2018    Hernia Repair   • OTHER SURGICAL HISTORY  09/24/2018    Angioplasty   • OTHER SURGICAL HISTORY  10/24/2018    Thoracoscopy Of Lungs And Pleural Space With Biopsy Of Lung Nodules     Current Outpatient Medications   Medication Sig Dispense Refill   • albuterol 90 mcg/actuation inhaler Inhale 2 puffs every 4 hours if needed for shortness of breath. 18 g 3   • aspirin 81 mg chewable tablet Chew.     • cholecalciferol (Vitamin D3) 25 MCG (1000 UT) tablet Take 1 tablet (1,000 Units) by mouth once daily.     • Eliquis 2.5 mg tablet Take by mouth.     • losartan-hydrochlorothiazide (Hyzaar) 50-12.5 mg tablet Take by mouth.     • metoprolol tartrate (Lopressor) 25 mg tablet      • nitroglycerin (Nitrostat) 0.4 mg SL tablet Place under the tongue.     • rosuvastatin (Crestor) 20 mg tablet      • Stiolto Respimat 2.5-2.5 mcg/actuation mist inhaler INHALE TWO PUFFS BY MOUTH ONCE DAILY 4 g 0   • tiZANidine (Zanaflex) 4 mg capsule Take 1 capsule (4 mg) by mouth 3 times a day as needed for muscle spasms. 90 capsule 0     No current facility-administered medications for this visit.     Allergies   Allergen Reactions   • Penicillins Swelling     Tobacco Use: Medium Risk (11/25/2024)    Patient History    • Smoking Tobacco Use: Former    • Smokeless Tobacco Use: Never    • Passive Exposure: Not on file  "    On examination:  Patient Vitals for the past 24 hrs:   BP Pulse Resp SpO2 Height Weight   11/25/24 1058 178/62 -- -- -- -- --   11/25/24 1057 180/64 71 18 97 % 1.778 m (5' 10\") 61.4 kg (135 lb 4.8 oz)   He does appear frail; BP high here today  No cervical bruits  JVP flat  +S1, S2, RRR, somewhat bradycardic  Clear lungs  Abd soft, benign, no pulsatile mass. No bruits in abdomen  No femoral bruits  Right leg pulses: fem 2+, pop 0, DP 0, PT 1; DP/PT monophasic  Left leg pulses 2+ throughout  Venous stasis changes on legs, skin dry  No calluses on feet, no ulcerations    Labs reviewed; last LDL cholesterol 37 mg/dL. Great  Component      Latest Ref Rng 3/25/2024 3/26/2024 11/20/2024   WBC      4.4 - 11.3 x10*3/uL  6.2  6.6    nRBC      0.0 - 0.0 /100 WBCs  0.0  0.0    RBC      4.50 - 5.90 x10*6/uL  3.98 (L)  4.11 (L)    HEMOGLOBIN      13.5 - 17.5 g/dL  12.7 (L)  13.1 (L)    HEMATOCRIT      41.0 - 52.0 %  38.5 (L)  40.7 (L)    MCV      80 - 100 fL  97  99    MCH      26.0 - 34.0 pg  31.9  31.9    MCHC      32.0 - 36.0 g/dL  33.0  32.2    RED CELL DISTRIBUTION WIDTH      11.5 - 14.5 %  12.9  13.2    Platelets      150 - 450 x10*3/uL  293  214    Neutrophils %      40.0 - 80.0 %  54.5  62.5    Immature Granulocytes %, Automated      0.0 - 0.9 %  0.5  0.5    Lymphocytes %      13.0 - 44.0 %  29.5  22.6    Monocytes %      2.0 - 10.0 %  11.5  12.1    Eosinophils %      0.0 - 6.0 %  3.4  1.8    Basophils %      0.0 - 2.0 %  0.6  0.5    Neutrophils Absolute      1.60 - 5.50 x10*3/uL  3.38  4.15    Immature Granulocytes Absolute, Automated      0.00 - 0.50 x10*3/uL  0.03  0.03    Lymphocytes Absolute      0.80 - 3.00 x10*3/uL  1.83  1.50    Monocytes Absolute      0.05 - 0.80 x10*3/uL  0.71  0.80    Eosinophils Absolute      0.00 - 0.40 x10*3/uL  0.21  0.12    Basophils Absolute      0.00 - 0.10 x10*3/uL  0.04  0.03    GLUCOSE      74 - 99 mg/dL 79   99    SODIUM      136 - 145 mmol/L 139   138    POTASSIUM      3.5 - " 5.3 mmol/L 4.8   4.3    CHLORIDE      98 - 107 mmol/L 101   103    Bicarbonate      21 - 32 mmol/L 28   28    Anion Gap      10 - 20 mmol/L 15   11    Blood Urea Nitrogen      6 - 23 mg/dL 37 (H)   27 (H)    Creatinine      0.50 - 1.30 mg/dL 1.20   1.08    EGFR      >60 mL/min/1.73m*2 59 (L)   67    Calcium      8.6 - 10.3 mg/dL 9.2   9.8    Albumin      3.4 - 5.0 g/dL 4.0   4.4    Alkaline Phosphatase      33 - 136 U/L 74   66    Total Protein      6.4 - 8.2 g/dL 6.7   7.0    AST      9 - 39 U/L 35   37    Bilirubin Total      0.0 - 1.2 mg/dL 0.5   0.8    ALT      10 - 52 U/L 31   27    CHOLESTEROL      0 - 199 mg/dL 105      HDL CHOLESTEROL      mg/dL 50.7      Cholesterol/HDL Ratio 2.1      LDL Calculated      <=99 mg/dL 37      VLDL      0 - 40 mg/dL 17      TRIGLYCERIDES      0 - 149 mg/dL 87      Non HDL Cholesterol      0 - 149 mg/dL 54      Thyroid Stimulating Hormone      0.44 - 3.98 mIU/L   1.19    Vitamin B12      211 - 911 pg/mL   385    Vitamin D, 25-Hydroxy, Total      30 - 100 ng/mL   55    MAGNESIUM      1.60 - 2.40 mg/dL   1.91      Assessment/Plan:  83 y.o. man with short distance claudication, mild elevation pallor. He had prior RLE atherectomy and DCB angioplasty in 1.2022 that did not help his sx. At this time, unless ABIs are very severely reduced, I'd favor conservative Rx of PAD. He was never prescribed PAD rehab. I think that could be helpful for him. I will refer as long as he doesn't have severe/flat lined perfusion to the leg.   We discussed cillos as another option, but given potential side effects and greater efficacy of rehab will start with that.    He is on a good medical regimen overall for PAD; he is on Eliquis for AFIB dosed per Dr. Villa and aspirin.  He is on ARB and statin Rx with last LDL < 37 mg/dL. He no longer smokes and has no DM.    WE discussed association of PAD and CAD and potential benefits of rehab.  I will confirm ABIs are OK (not done in years) and send to rehab. I  do see note from Dr. Villa that pt. Had carotid scan done in past; I will try to track down results.    I will arrange to see him quickly after the exercise REHAN and in follow-up in 4-6 months.    Linda Camairllo MD

## 2024-11-25 NOTE — PATIENT INSTRUCTIONS
Continue medications as prescribed.  Referral placed for PAD rehab.      See you back in 4 months for check in.    Linda Camarillo MD  Co-Director, Vascular Center  [facility] [place], [facility]   [facility] Master Clinician in Fibromuscular Dysplasia and Vascular Care  Professor of Medicine  [facility]

## 2024-11-25 NOTE — PROGRESS NOTES
"11/25/24  11:43 AM    Vascular Medicine    Mr. Gutierres is a 83 y.o. man referred for claudication by Dr. Ramirez.    He reports that ~3 years ago he began noticing progressive burning in his legs with walking.  He also has an irregular pulse.    This led to cardiac and vascular work up at [facility]. He was dx with AFIB and underwent stress testing with inferior MI/ischemia followed by PCI of the RCA by Dr. Villa.    He was referred to Dr. Dixon and underwent the following procedure on 1.31.2022:  Abdominal Aortogram with bilateral extremity angiograms. 1/31/22     Completed   Atherectomy-Femoral Popliteal Artery. 1/31/22     Completed   DCB Percutaneous Transluminal Angioplasty-Femoral Popliteal Artery. 1/31/22     Completed     He reports that he had no improvement of his sx post intervention. He was frustrated as it was a prolonged procedure.  He has continued to \"live with\" his leg sx and has adapted. He was not offered rehab before intervention.    He can currently walk ~ 20 steps without pain, then both thighs burn. He has to stop after 100 steps as legs get weak. He notices in the morning his right foot is pale. He has not had any ulcerations and denies pain at rest.    He does have some balance issues. He has neck pain.   From a cardiac perspective he has no chest pain or dyspnea and feels that his 2 coronary stents helped him.  He is on Eliquis 2.5 mg BID and aspirin (weight just over 60kg and age 83 years).  Also on medications for BP control and lipid lowering Rx. He has no DM. He smoked in the past, quite years ago.    Patient Active Problem List   Diagnosis    CAD (coronary artery disease)    Emphysema lung (Multi)    Essential hypertension    Hyperlipidemia    Iron deficiency anemia    Paroxysmal atrial fibrillation (Multi)    Pulmonary arterial hypertension (Multi)    PAD (peripheral artery disease) (CMS-[facility])     Past Surgical History:   Procedure Laterality Date    CHOLECYSTECTOMY  09/24/2018    " "Cholecystectomy    HERNIA REPAIR  09/24/2018    Hernia Repair    OTHER SURGICAL HISTORY  09/24/2018    Angioplasty    OTHER SURGICAL HISTORY  10/24/2018    Thoracoscopy Of Lungs And Pleural Space With Biopsy Of Lung Nodules     Current Outpatient Medications   Medication Sig Dispense Refill    albuterol 90 mcg/actuation inhaler Inhale 2 puffs every 4 hours if needed for shortness of breath. 18 g 3    aspirin 81 mg chewable tablet Chew.      cholecalciferol (Vitamin D3) 25 MCG (1000 UT) tablet Take 1 tablet (1,000 Units) by mouth once daily.      Eliquis 2.5 mg tablet Take by mouth.      losartan-hydrochlorothiazide (Hyzaar) 50-12.5 mg tablet Take by mouth.      metoprolol tartrate (Lopressor) 25 mg tablet       nitroglycerin (Nitrostat) 0.4 mg SL tablet Place under the tongue.      rosuvastatin (Crestor) 20 mg tablet       Stiolto Respimat 2.5-2.5 mcg/actuation mist inhaler INHALE TWO PUFFS BY MOUTH ONCE DAILY 4 g 0    tiZANidine (Zanaflex) 4 mg capsule Take 1 capsule (4 mg) by mouth 3 times a day as needed for muscle spasms. 90 capsule 0     No current facility-administered medications for this visit.     Allergies   Allergen Reactions    Penicillins Swelling     Tobacco Use: Medium Risk (11/25/2024)    Patient History     Smoking Tobacco Use: Former     Smokeless Tobacco Use: Never     Passive Exposure: Not on file     On examination:  Patient Vitals for the past 24 hrs:   BP Pulse Resp SpO2 Height Weight   11/25/24 1058 178/62 -- -- -- -- --   11/25/24 1057 180/64 71 18 97 % 1.778 m (5' 10\") 61.4 kg (135 lb 4.8 oz)   He does appear frail; BP high here today  No cervical bruits  JVP flat  +S1, S2, RRR, somewhat bradycardic  Clear lungs  Abd soft, benign, no pulsatile mass. No bruits in abdomen  No femoral bruits  Right leg pulses: fem 2+, pop 0, DP 0, PT 1; DP/PT monophasic  Left leg pulses 2+ throughout  Venous stasis changes on legs, skin dry  No calluses on feet, no ulcerations    Labs reviewed; last LDL " cholesterol 37 mg/dL. Great  Component      Latest Ref Rng 3/25/2024 3/26/2024 11/20/2024   WBC      4.4 - 11.3 x10*3/uL  6.2  6.6    nRBC      0.0 - 0.0 /100 WBCs  0.0  0.0    RBC      4.50 - 5.90 x10*6/uL  3.98 (L)  4.11 (L)    HEMOGLOBIN      13.5 - 17.5 g/dL  12.7 (L)  13.1 (L)    HEMATOCRIT      41.0 - 52.0 %  38.5 (L)  40.7 (L)    MCV      80 - 100 fL  97  99    MCH      26.0 - 34.0 pg  31.9  31.9    MCHC      32.0 - 36.0 g/dL  33.0  32.2    RED CELL DISTRIBUTION WIDTH      11.5 - 14.5 %  12.9  13.2    Platelets      150 - 450 x10*3/uL  293  214    Neutrophils %      40.0 - 80.0 %  54.5  62.5    Immature Granulocytes %, Automated      0.0 - 0.9 %  0.5  0.5    Lymphocytes %      13.0 - 44.0 %  29.5  22.6    Monocytes %      2.0 - 10.0 %  11.5  12.1    Eosinophils %      0.0 - 6.0 %  3.4  1.8    Basophils %      0.0 - 2.0 %  0.6  0.5    Neutrophils Absolute      1.60 - 5.50 x10*3/uL  3.38  4.15    Immature Granulocytes Absolute, Automated      0.00 - 0.50 x10*3/uL  0.03  0.03    Lymphocytes Absolute      0.80 - 3.00 x10*3/uL  1.83  1.50    Monocytes Absolute      0.05 - 0.80 x10*3/uL  0.71  0.80    Eosinophils Absolute      0.00 - 0.40 x10*3/uL  0.21  0.12    Basophils Absolute      0.00 - 0.10 x10*3/uL  0.04  0.03    GLUCOSE      74 - 99 mg/dL 79   99    SODIUM      136 - 145 mmol/L 139   138    POTASSIUM      3.5 - 5.3 mmol/L 4.8   4.3    CHLORIDE      98 - 107 mmol/L 101   103    Bicarbonate      21 - 32 mmol/L 28   28    Anion Gap      10 - 20 mmol/L 15   11    Blood Urea Nitrogen      6 - 23 mg/dL 37 (H)   27 (H)    Creatinine      0.50 - 1.30 mg/dL 1.20   1.08    EGFR      >60 mL/min/1.73m*2 59 (L)   67    Calcium      8.6 - 10.3 mg/dL 9.2   9.8    Albumin      3.4 - 5.0 g/dL 4.0   4.4    Alkaline Phosphatase      33 - 136 U/L 74   66    Total Protein      6.4 - 8.2 g/dL 6.7   7.0    AST      9 - 39 U/L 35   37    Bilirubin Total      0.0 - 1.2 mg/dL 0.5   0.8    ALT      10 - 52 U/L 31   27     CHOLESTEROL      0 - 199 mg/dL 105      HDL CHOLESTEROL      mg/dL 50.7      Cholesterol/HDL Ratio 2.1      LDL Calculated      <=99 mg/dL 37      VLDL      0 - 40 mg/dL 17      TRIGLYCERIDES      0 - 149 mg/dL 87      Non HDL Cholesterol      0 - 149 mg/dL 54      Thyroid Stimulating Hormone      0.44 - 3.98 mIU/L   1.19    Vitamin B12      211 - 911 pg/mL   385    Vitamin D, 25-Hydroxy, Total      30 - 100 ng/mL   55    MAGNESIUM      1.60 - 2.40 mg/dL   1.91      Assessment/Plan:  83 y.o. man with short distance claudication, mild elevation pallor. He had prior RLE atherectomy and DCB angioplasty in 1.2022 that did not help his sx. At this time, unless ABIs are very severely reduced, I'd favor conservative Rx of PAD. He was never prescribed PAD rehab. I think that could be helpful for him. I will refer as long as he doesn't have severe/flat lined perfusion to the leg.   We discussed cillos as another option, but given potential side effects and greater efficacy of rehab will start with that.    He is on a good medical regimen overall for PAD; he is on Eliquis for AFIB dosed per Dr. Villa and aspirin.  He is on ARB and statin Rx with last LDL < 37 mg/dL. He no longer smokes and has no DM.    WE discussed association of PAD and CAD and potential benefits of rehab.  I will confirm ABIs are OK (not done in years) and send to rehab. I do see note from Dr. Villa that pt. Had carotid scan done in past; I will try to track down results.    I will arrange to see him quickly after the exercise REHAN and in follow-up in 4-6 months.    Linda Camarillo MD    11/25/24  12:50 PM    Patient seen in vascular lab.      Right REHAN 0.77, mild disease at arrest   Left REHAN 1.10 normal at rest  NO real change with exercise (did modified protocol not on full incline)  Will refer to PAD rehab and hope this improves functional capacity.    Linda Camarillo MD

## 2024-12-02 ENCOUNTER — TELEPHONE (OUTPATIENT)
Dept: CARDIOLOGY | Facility: HOSPITAL | Age: 84
End: 2024-12-02
Payer: COMMERCIAL

## 2024-12-02 DIAGNOSIS — I73.9 PAD (PERIPHERAL ARTERY DISEASE) (CMS-HCC): ICD-10-CM

## 2024-12-02 DIAGNOSIS — Z98.62 H/O ANGIOPLASTY: ICD-10-CM

## 2024-12-02 NOTE — TELEPHONE ENCOUNTER
Spoke to patient about his next steps. We apologized for the delay in return of messages on [employer],  as we were on vacation.  We discussed him going to PAD rehab for the issues with his legs, which we ordered, printed, and gave to him at his appointment with us on 11/25/24. He states he does not recall this discussion or receiving the results of the REHAN we did in the office.  Dr. Camarillo reiterated his need for PAD rehab, and suggested a vascular surgery consultation.  We asked him to call us back with any questions or concerns.

## 2024-12-04 DIAGNOSIS — J42 CHRONIC BRONCHITIS, UNSPECIFIED CHRONIC BRONCHITIS TYPE (MULTI): ICD-10-CM

## 2024-12-06 RX ORDER — TIOTROPIUM BROMIDE AND OLODATEROL 3.124; 2.736 UG/1; UG/1
2 SPRAY, METERED RESPIRATORY (INHALATION) DAILY
Qty: 4 G | Refills: 11 | Status: SHIPPED | OUTPATIENT
Start: 2024-12-06

## 2025-01-06 ENCOUNTER — TELEPHONE (OUTPATIENT)
Dept: PRIMARY CARE | Facility: CLINIC | Age: 85
End: 2025-01-06
Payer: COMMERCIAL

## 2025-01-06 DIAGNOSIS — J42 CHRONIC BRONCHITIS, UNSPECIFIED CHRONIC BRONCHITIS TYPE (MULTI): ICD-10-CM

## 2025-01-07 RX ORDER — TIOTROPIUM BROMIDE AND OLODATEROL 3.124; 2.736 UG/1; UG/1
2 SPRAY, METERED RESPIRATORY (INHALATION) DAILY
Qty: 4 G | Refills: 11 | Status: SHIPPED | OUTPATIENT
Start: 2025-01-07

## 2025-01-07 RX ORDER — ALBUTEROL SULFATE 90 UG/1
2 INHALANT RESPIRATORY (INHALATION) EVERY 4 HOURS PRN
Qty: 18 G | Refills: 3 | Status: SHIPPED | OUTPATIENT
Start: 2025-01-07

## 2025-01-09 ENCOUNTER — PATIENT MESSAGE (OUTPATIENT)
Dept: PRIMARY CARE | Facility: CLINIC | Age: 85
End: 2025-01-09
Payer: COMMERCIAL

## 2025-01-14 ENCOUNTER — APPOINTMENT (OUTPATIENT)
Dept: CARDIAC REHAB | Facility: HOSPITAL | Age: 85
End: 2025-01-14
Payer: COMMERCIAL

## 2025-02-20 ENCOUNTER — APPOINTMENT (OUTPATIENT)
Dept: PRIMARY CARE | Facility: CLINIC | Age: 85
End: 2025-02-20
Payer: COMMERCIAL

## 2025-04-15 ENCOUNTER — APPOINTMENT (OUTPATIENT)
Dept: CARDIOLOGY | Facility: HOSPITAL | Age: 85
End: 2025-04-15
Payer: COMMERCIAL

## 2025-04-16 ENCOUNTER — HOSPITAL ENCOUNTER (OUTPATIENT)
Facility: HOSPITAL | Age: 85
Setting detail: OBSERVATION
Discharge: HOME | End: 2025-04-17
Attending: STUDENT IN AN ORGANIZED HEALTH CARE EDUCATION/TRAINING PROGRAM | Admitting: INTERNAL MEDICINE
Payer: COMMERCIAL

## 2025-04-16 ENCOUNTER — APPOINTMENT (OUTPATIENT)
Dept: CARDIOLOGY | Facility: HOSPITAL | Age: 85
End: 2025-04-16
Payer: COMMERCIAL

## 2025-04-16 ENCOUNTER — APPOINTMENT (OUTPATIENT)
Dept: RADIOLOGY | Facility: HOSPITAL | Age: 85
End: 2025-04-16
Payer: COMMERCIAL

## 2025-04-16 DIAGNOSIS — I48.91 ATRIAL FIBRILLATION WITH RVR (MULTI): Primary | ICD-10-CM

## 2025-04-16 LAB
ALBUMIN SERPL BCP-MCNC: 4.5 G/DL (ref 3.4–5)
ALP SERPL-CCNC: 69 U/L (ref 33–136)
ALT SERPL W P-5'-P-CCNC: 28 U/L (ref 10–52)
ANION GAP SERPL CALC-SCNC: 14 MMOL/L (ref 10–20)
AST SERPL W P-5'-P-CCNC: 36 U/L (ref 9–39)
BASOPHILS # BLD AUTO: 0.02 X10*3/UL (ref 0–0.1)
BASOPHILS NFR BLD AUTO: 0.3 %
BILIRUB SERPL-MCNC: 0.9 MG/DL (ref 0–1.2)
BNP SERPL-MCNC: 110 PG/ML (ref 0–99)
BUN SERPL-MCNC: 36 MG/DL (ref 6–23)
CALCIUM SERPL-MCNC: 9.4 MG/DL (ref 8.6–10.3)
CARDIAC TROPONIN I PNL SERPL HS: 9 NG/L (ref 0–20)
CARDIAC TROPONIN I PNL SERPL HS: 9 NG/L (ref 0–20)
CHLORIDE SERPL-SCNC: 99 MMOL/L (ref 98–107)
CO2 SERPL-SCNC: 28 MMOL/L (ref 21–32)
CREAT SERPL-MCNC: 1.08 MG/DL (ref 0.5–1.3)
EGFRCR SERPLBLD CKD-EPI 2021: 67 ML/MIN/1.73M*2
EOSINOPHIL # BLD AUTO: 0.06 X10*3/UL (ref 0–0.4)
EOSINOPHIL NFR BLD AUTO: 0.9 %
ERYTHROCYTE [DISTWIDTH] IN BLOOD BY AUTOMATED COUNT: 13.1 % (ref 11.5–14.5)
GLUCOSE SERPL-MCNC: 90 MG/DL (ref 74–99)
HCT VFR BLD AUTO: 42.1 % (ref 41–52)
HGB BLD-MCNC: 13.7 G/DL (ref 13.5–17.5)
IMM GRANULOCYTES # BLD AUTO: 0.02 X10*3/UL (ref 0–0.5)
IMM GRANULOCYTES NFR BLD AUTO: 0.3 % (ref 0–0.9)
INR PPP: 1.7 (ref 0.9–1.1)
LYMPHOCYTES # BLD AUTO: 1.33 X10*3/UL (ref 0.8–3)
LYMPHOCYTES NFR BLD AUTO: 19.2 %
MAGNESIUM SERPL-MCNC: 1.99 MG/DL (ref 1.6–2.4)
MCH RBC QN AUTO: 31.9 PG (ref 26–34)
MCHC RBC AUTO-ENTMCNC: 32.5 G/DL (ref 32–36)
MCV RBC AUTO: 98 FL (ref 80–100)
MONOCYTES # BLD AUTO: 0.64 X10*3/UL (ref 0.05–0.8)
MONOCYTES NFR BLD AUTO: 9.2 %
NEUTROPHILS # BLD AUTO: 4.85 X10*3/UL (ref 1.6–5.5)
NEUTROPHILS NFR BLD AUTO: 70.1 %
NRBC BLD-RTO: 0 /100 WBCS (ref 0–0)
PLATELET # BLD AUTO: 179 X10*3/UL (ref 150–450)
POTASSIUM SERPL-SCNC: 4.2 MMOL/L (ref 3.5–5.3)
PROT SERPL-MCNC: 7.2 G/DL (ref 6.4–8.2)
PROTHROMBIN TIME: 18.3 SECONDS (ref 9.8–12.4)
RBC # BLD AUTO: 4.29 X10*6/UL (ref 4.5–5.9)
SODIUM SERPL-SCNC: 137 MMOL/L (ref 136–145)
WBC # BLD AUTO: 6.9 X10*3/UL (ref 4.4–11.3)

## 2025-04-16 PROCEDURE — 83735 ASSAY OF MAGNESIUM: CPT

## 2025-04-16 PROCEDURE — 84484 ASSAY OF TROPONIN QUANT: CPT

## 2025-04-16 PROCEDURE — G0378 HOSPITAL OBSERVATION PER HR: HCPCS

## 2025-04-16 PROCEDURE — 85025 COMPLETE CBC W/AUTO DIFF WBC: CPT

## 2025-04-16 PROCEDURE — 2500000004 HC RX 250 GENERAL PHARMACY W/ HCPCS (ALT 636 FOR OP/ED): Mod: JZ

## 2025-04-16 PROCEDURE — 71046 X-RAY EXAM CHEST 2 VIEWS: CPT

## 2025-04-16 PROCEDURE — 71046 X-RAY EXAM CHEST 2 VIEWS: CPT | Performed by: RADIOLOGY

## 2025-04-16 PROCEDURE — 83880 ASSAY OF NATRIURETIC PEPTIDE: CPT

## 2025-04-16 PROCEDURE — 99285 EMERGENCY DEPT VISIT HI MDM: CPT | Mod: 25 | Performed by: STUDENT IN AN ORGANIZED HEALTH CARE EDUCATION/TRAINING PROGRAM

## 2025-04-16 PROCEDURE — 85610 PROTHROMBIN TIME: CPT

## 2025-04-16 PROCEDURE — 80053 COMPREHEN METABOLIC PANEL: CPT

## 2025-04-16 PROCEDURE — 36415 COLL VENOUS BLD VENIPUNCTURE: CPT

## 2025-04-16 PROCEDURE — 93005 ELECTROCARDIOGRAM TRACING: CPT

## 2025-04-16 PROCEDURE — 96374 THER/PROPH/DIAG INJ IV PUSH: CPT

## 2025-04-16 PROCEDURE — 2500000001 HC RX 250 WO HCPCS SELF ADMINISTERED DRUGS (ALT 637 FOR MEDICARE OP)

## 2025-04-16 RX ORDER — ONDANSETRON 4 MG/1
4 TABLET, FILM COATED ORAL EVERY 8 HOURS PRN
Status: DISCONTINUED | OUTPATIENT
Start: 2025-04-16 | End: 2025-04-17 | Stop reason: HOSPADM

## 2025-04-16 RX ORDER — ONDANSETRON HYDROCHLORIDE 2 MG/ML
4 INJECTION, SOLUTION INTRAVENOUS EVERY 8 HOURS PRN
Status: DISCONTINUED | OUTPATIENT
Start: 2025-04-16 | End: 2025-04-17 | Stop reason: HOSPADM

## 2025-04-16 RX ORDER — GUAIFENESIN/DEXTROMETHORPHAN 100-10MG/5
5 SYRUP ORAL EVERY 4 HOURS PRN
Status: DISCONTINUED | OUTPATIENT
Start: 2025-04-16 | End: 2025-04-17 | Stop reason: HOSPADM

## 2025-04-16 RX ORDER — ALBUTEROL SULFATE 90 UG/1
2 INHALANT RESPIRATORY (INHALATION) EVERY 4 HOURS PRN
Status: DISCONTINUED | OUTPATIENT
Start: 2025-04-16 | End: 2025-04-16

## 2025-04-16 RX ORDER — METOPROLOL TARTRATE 25 MG/1
25 TABLET, FILM COATED ORAL 2 TIMES DAILY
Status: DISCONTINUED | OUTPATIENT
Start: 2025-04-16 | End: 2025-04-17 | Stop reason: HOSPADM

## 2025-04-16 RX ORDER — ROSUVASTATIN CALCIUM 10 MG/1
20 TABLET, COATED ORAL DAILY
Status: DISCONTINUED | OUTPATIENT
Start: 2025-04-17 | End: 2025-04-17 | Stop reason: HOSPADM

## 2025-04-16 RX ORDER — ACETAMINOPHEN 160 MG/5ML
650 SOLUTION ORAL EVERY 4 HOURS PRN
Status: DISCONTINUED | OUTPATIENT
Start: 2025-04-16 | End: 2025-04-17 | Stop reason: HOSPADM

## 2025-04-16 RX ORDER — FORMOTEROL FUMARATE 20 UG/2ML
20 SOLUTION RESPIRATORY (INHALATION)
Status: DISCONTINUED | OUTPATIENT
Start: 2025-04-16 | End: 2025-04-17 | Stop reason: HOSPADM

## 2025-04-16 RX ORDER — NAPROXEN SODIUM 220 MG/1
81 TABLET, FILM COATED ORAL EVERY MORNING
Status: DISCONTINUED | OUTPATIENT
Start: 2025-04-17 | End: 2025-04-17 | Stop reason: HOSPADM

## 2025-04-16 RX ORDER — METOPROLOL TARTRATE 1 MG/ML
5 INJECTION, SOLUTION INTRAVENOUS
Status: COMPLETED | OUTPATIENT
Start: 2025-04-16 | End: 2025-04-16

## 2025-04-16 RX ORDER — POLYETHYLENE GLYCOL 3350 17 G/17G
17 POWDER, FOR SOLUTION ORAL DAILY
Status: DISCONTINUED | OUTPATIENT
Start: 2025-04-16 | End: 2025-04-17 | Stop reason: HOSPADM

## 2025-04-16 RX ORDER — DEXTROSE 50 % IN WATER (D50W) INTRAVENOUS SYRINGE
12.5
Status: DISCONTINUED | OUTPATIENT
Start: 2025-04-16 | End: 2025-04-17 | Stop reason: HOSPADM

## 2025-04-16 RX ORDER — NITROGLYCERIN 0.4 MG/1
0.4 TABLET SUBLINGUAL EVERY 5 MIN PRN
Status: DISCONTINUED | OUTPATIENT
Start: 2025-04-16 | End: 2025-04-17 | Stop reason: HOSPADM

## 2025-04-16 RX ORDER — ACETAMINOPHEN 325 MG/1
650 TABLET ORAL EVERY 4 HOURS PRN
Status: DISCONTINUED | OUTPATIENT
Start: 2025-04-16 | End: 2025-04-17 | Stop reason: HOSPADM

## 2025-04-16 RX ORDER — ACETAMINOPHEN 650 MG/1
650 SUPPOSITORY RECTAL EVERY 4 HOURS PRN
Status: DISCONTINUED | OUTPATIENT
Start: 2025-04-16 | End: 2025-04-17 | Stop reason: HOSPADM

## 2025-04-16 RX ORDER — MULTIVIT-MIN/IRON FUM/FOLIC AC 7.5 MG-4
1 TABLET ORAL DAILY
COMMUNITY

## 2025-04-16 RX ORDER — DEXTROSE 50 % IN WATER (D50W) INTRAVENOUS SYRINGE
25
Status: DISCONTINUED | OUTPATIENT
Start: 2025-04-16 | End: 2025-04-17 | Stop reason: HOSPADM

## 2025-04-16 RX ORDER — ALBUTEROL SULFATE 90 UG/1
2 INHALANT RESPIRATORY (INHALATION) EVERY 2 HOUR PRN
Status: DISCONTINUED | OUTPATIENT
Start: 2025-04-16 | End: 2025-04-17 | Stop reason: HOSPADM

## 2025-04-16 RX ORDER — CHOLECALCIFEROL (VITAMIN D3) 25 MCG
25 TABLET ORAL DAILY
Status: DISCONTINUED | OUTPATIENT
Start: 2025-04-17 | End: 2025-04-17 | Stop reason: HOSPADM

## 2025-04-16 RX ADMIN — APIXABAN 2.5 MG: 5 TABLET, FILM COATED ORAL at 21:26

## 2025-04-16 RX ADMIN — METOPROLOL TARTRATE 5 MG: 5 INJECTION INTRAVENOUS at 16:08

## 2025-04-16 RX ADMIN — METOPROLOL TARTRATE 5 MG: 5 INJECTION INTRAVENOUS at 16:15

## 2025-04-16 RX ADMIN — METOPROLOL TARTRATE 25 MG: 25 TABLET, FILM COATED ORAL at 21:51

## 2025-04-16 RX ADMIN — METOPROLOL TARTRATE 5 MG: 5 INJECTION INTRAVENOUS at 15:59

## 2025-04-16 SDOH — SOCIAL STABILITY: SOCIAL INSECURITY: WITHIN THE LAST YEAR, HAVE YOU BEEN AFRAID OF YOUR PARTNER OR EX-PARTNER?: NO

## 2025-04-16 SDOH — HEALTH STABILITY: MENTAL HEALTH: HOW OFTEN DO YOU HAVE SIX OR MORE DRINKS ON ONE OCCASION?: NEVER

## 2025-04-16 SDOH — SOCIAL STABILITY: SOCIAL INSECURITY
WITHIN THE LAST YEAR, HAVE YOU BEEN RAPED OR FORCED TO HAVE ANY KIND OF SEXUAL ACTIVITY BY YOUR PARTNER OR EX-PARTNER?: NO

## 2025-04-16 SDOH — ECONOMIC STABILITY: HOUSING INSECURITY: IN THE LAST 12 MONTHS, WAS THERE A TIME WHEN YOU WERE NOT ABLE TO PAY THE MORTGAGE OR RENT ON TIME?: NO

## 2025-04-16 SDOH — HEALTH STABILITY: MENTAL HEALTH: HOW OFTEN DO YOU HAVE A DRINK CONTAINING ALCOHOL?: NEVER

## 2025-04-16 SDOH — SOCIAL STABILITY: SOCIAL INSECURITY: DO YOU FEEL UNSAFE GOING BACK TO THE PLACE WHERE YOU ARE LIVING?: NO

## 2025-04-16 SDOH — ECONOMIC STABILITY: FOOD INSECURITY: WITHIN THE PAST 12 MONTHS, YOU WORRIED THAT YOUR FOOD WOULD RUN OUT BEFORE YOU GOT THE MONEY TO BUY MORE.: NEVER TRUE

## 2025-04-16 SDOH — SOCIAL STABILITY: SOCIAL INSECURITY: ARE YOU OR HAVE YOU BEEN THREATENED OR ABUSED PHYSICALLY, EMOTIONALLY, OR SEXUALLY BY ANYONE?: NO

## 2025-04-16 SDOH — SOCIAL STABILITY: SOCIAL INSECURITY: WITHIN THE LAST YEAR, HAVE YOU BEEN HUMILIATED OR EMOTIONALLY ABUSED IN OTHER WAYS BY YOUR PARTNER OR EX-PARTNER?: NO

## 2025-04-16 SDOH — SOCIAL STABILITY: SOCIAL INSECURITY: ARE THERE ANY APPARENT SIGNS OF INJURIES/BEHAVIORS THAT COULD BE RELATED TO ABUSE/NEGLECT?: NO

## 2025-04-16 SDOH — SOCIAL STABILITY: SOCIAL INSECURITY
WITHIN THE LAST YEAR, HAVE YOU BEEN KICKED, HIT, SLAPPED, OR OTHERWISE PHYSICALLY HURT BY YOUR PARTNER OR EX-PARTNER?: NO

## 2025-04-16 SDOH — SOCIAL STABILITY: SOCIAL INSECURITY: DO YOU FEEL ANYONE HAS EXPLOITED OR TAKEN ADVANTAGE OF YOU FINANCIALLY OR OF YOUR PERSONAL PROPERTY?: NO

## 2025-04-16 SDOH — ECONOMIC STABILITY: FOOD INSECURITY: HOW HARD IS IT FOR YOU TO PAY FOR THE VERY BASICS LIKE FOOD, HOUSING, MEDICAL CARE, AND HEATING?: NOT HARD AT ALL

## 2025-04-16 SDOH — ECONOMIC STABILITY: INCOME INSECURITY: IN THE PAST 12 MONTHS HAS THE ELECTRIC, GAS, OIL, OR WATER COMPANY THREATENED TO SHUT OFF SERVICES IN YOUR HOME?: NO

## 2025-04-16 SDOH — HEALTH STABILITY: MENTAL HEALTH: HOW MANY DRINKS CONTAINING ALCOHOL DO YOU HAVE ON A TYPICAL DAY WHEN YOU ARE DRINKING?: PATIENT DOES NOT DRINK

## 2025-04-16 SDOH — ECONOMIC STABILITY: FOOD INSECURITY: WITHIN THE PAST 12 MONTHS, THE FOOD YOU BOUGHT JUST DIDN'T LAST AND YOU DIDN'T HAVE MONEY TO GET MORE.: NEVER TRUE

## 2025-04-16 SDOH — ECONOMIC STABILITY: HOUSING INSECURITY: AT ANY TIME IN THE PAST 12 MONTHS, WERE YOU HOMELESS OR LIVING IN A SHELTER (INCLUDING NOW)?: NO

## 2025-04-16 SDOH — SOCIAL STABILITY: SOCIAL INSECURITY: WERE YOU ABLE TO COMPLETE ALL THE BEHAVIORAL HEALTH SCREENINGS?: YES

## 2025-04-16 SDOH — SOCIAL STABILITY: SOCIAL INSECURITY: HAS ANYONE EVER THREATENED TO HURT YOUR FAMILY OR YOUR PETS?: NO

## 2025-04-16 SDOH — ECONOMIC STABILITY: HOUSING INSECURITY: IN THE PAST 12 MONTHS, HOW MANY TIMES HAVE YOU MOVED WHERE YOU WERE LIVING?: 0

## 2025-04-16 SDOH — SOCIAL STABILITY: SOCIAL INSECURITY: DOES ANYONE TRY TO KEEP YOU FROM HAVING/CONTACTING OTHER FRIENDS OR DOING THINGS OUTSIDE YOUR HOME?: NO

## 2025-04-16 SDOH — SOCIAL STABILITY: SOCIAL INSECURITY: HAVE YOU HAD ANY THOUGHTS OF HARMING ANYONE ELSE?: NO

## 2025-04-16 SDOH — ECONOMIC STABILITY: TRANSPORTATION INSECURITY: IN THE PAST 12 MONTHS, HAS LACK OF TRANSPORTATION KEPT YOU FROM MEDICAL APPOINTMENTS OR FROM GETTING MEDICATIONS?: NO

## 2025-04-16 SDOH — SOCIAL STABILITY: SOCIAL INSECURITY: HAVE YOU HAD THOUGHTS OF HARMING ANYONE ELSE?: NO

## 2025-04-16 SDOH — SOCIAL STABILITY: SOCIAL INSECURITY: ABUSE: ADULT

## 2025-04-16 ASSESSMENT — ACTIVITIES OF DAILY LIVING (ADL)
HEARING - RIGHT EAR: FUNCTIONAL
HEARING - LEFT EAR: FUNCTIONAL
TOILETING: INDEPENDENT
ASSISTIVE_DEVICE: EYEGLASSES
WALKS IN HOME: INDEPENDENT
ADEQUATE_TO_COMPLETE_ADL: YES
BATHING: INDEPENDENT
LACK_OF_TRANSPORTATION: NO
FEEDING YOURSELF: INDEPENDENT
DRESSING YOURSELF: INDEPENDENT
GROOMING: INDEPENDENT
PATIENT'S MEMORY ADEQUATE TO SAFELY COMPLETE DAILY ACTIVITIES?: YES
JUDGMENT_ADEQUATE_SAFELY_COMPLETE_DAILY_ACTIVITIES: YES

## 2025-04-16 ASSESSMENT — COLUMBIA-SUICIDE SEVERITY RATING SCALE - C-SSRS
2. HAVE YOU ACTUALLY HAD ANY THOUGHTS OF KILLING YOURSELF?: NO
6. HAVE YOU EVER DONE ANYTHING, STARTED TO DO ANYTHING, OR PREPARED TO DO ANYTHING TO END YOUR LIFE?: NO
1. IN THE PAST MONTH, HAVE YOU WISHED YOU WERE DEAD OR WISHED YOU COULD GO TO SLEEP AND NOT WAKE UP?: NO

## 2025-04-16 ASSESSMENT — COGNITIVE AND FUNCTIONAL STATUS - GENERAL
MOBILITY SCORE: 24
DAILY ACTIVITIY SCORE: 24
PATIENT BASELINE BEDBOUND: NO

## 2025-04-16 ASSESSMENT — LIFESTYLE VARIABLES
HOW MANY STANDARD DRINKS CONTAINING ALCOHOL DO YOU HAVE ON A TYPICAL DAY: PATIENT DOES NOT DRINK
AUDIT-C TOTAL SCORE: 0
SKIP TO QUESTIONS 9-10: 1
AUDIT-C TOTAL SCORE: 0
HOW OFTEN DO YOU HAVE 6 OR MORE DRINKS ON ONE OCCASION: NEVER
SKIP TO QUESTIONS 9-10: 1
HOW OFTEN DO YOU HAVE A DRINK CONTAINING ALCOHOL: NEVER
AUDIT-C TOTAL SCORE: 0

## 2025-04-16 ASSESSMENT — PATIENT HEALTH QUESTIONNAIRE - PHQ9
1. LITTLE INTEREST OR PLEASURE IN DOING THINGS: NOT AT ALL
2. FEELING DOWN, DEPRESSED OR HOPELESS: NOT AT ALL
SUM OF ALL RESPONSES TO PHQ9 QUESTIONS 1 & 2: 0

## 2025-04-16 NOTE — ED TRIAGE NOTES
Pt coming in for afib. Has been going on for around 16 hours or so. States that he was unable to get his heart rate to go down. Has been able to manage it in the past by just relaxing. Does take eliquis. States that he feels short of breath and can feel that it is in afib. Denies any chest pain.

## 2025-04-16 NOTE — H&P
History Of Present Illness  Connor Gutierres is a 84 y.o. 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  Patient says he has had a history of atrial fibrillation for many years.  He says he has never had symptoms associated with atrial fibrillation like palpitations, shortness of breath or any chest pain.  He says he usually has to check his watch to see if his heart rate is high.  He said his primary cardiologist told him if his heart rate stays elevated for over an hour he should go to the nearest ED.  He said yesterday at midnight he noticed his heart rate high in the 120s 130s, prompting him to come to the ED.  He does not endorse any other active complaints like chest pain or shortness of breath.  He denies any dysuria, he does have some frequency, no urgency.  He does not endorse any abdominal symptoms like constipation, diarrhea.  He does not endorse chills.  He says he feels tingling and some numbness bilaterally in his lower extremities, he said that has been chronic for many years.  He is able to appreciate tactile stimulation.    ED course:  Vitals on admission: 137/85, pulse 116, respiratory rate 16, SpO2 96%, on room air  Glucose 90, sodium 137, potassium 4.2, bicarbonate 28, anion gap 10, creatinine 1.08  ALP 69, ALT 28, AST 36, total bilirubin 0.9  , magnesium 1.99, troponin 9 repeat 9  PT 18.3, INR 1.7  No leukocytosis was noted, no anemia, no thrombocytopenia  CXR was negative for any acute cardiopulmonary pathology, other than changes consistent with COPD  Patient was given 5 mg Lopressor.  Patient was admitted to general medicine service for further management    Surgical History  He has a past surgical  history that includes Hernia repair (09/24/2018); Other surgical history (09/24/2018); Cholecystectomy (09/24/2018); and Other surgical history (10/24/2018).     Social History  He reports that he has quit smoking. His smoking use included cigarettes. He has never used smokeless tobacco. He reports that he does not drink alcohol and does not use drugs.    Family History  Family History[1]     Allergies  Penicillins    Review of Systems  Normal unless mentioned above  Physical Exam  HENT:      Head: Normocephalic.      Nose: Nose normal.   Eyes:      Conjunctiva/sclera: Conjunctivae normal.   Cardiovascular:      Rate and Rhythm: Tachycardia present. Rhythm irregular.   Pulmonary:      Breath sounds: Normal breath sounds.   Abdominal:      Palpations: Abdomen is soft.   Musculoskeletal:         General: Normal range of motion.      Cervical back: Normal range of motion.   Skin:     General: Skin is warm.   Neurological:      Mental Status: He is alert and oriented to person, place, and time. Mental status is at baseline.   Psychiatric:         Mood and Affect: Mood normal.          Last Recorded Vitals  /77   Pulse (!) 114   Temp 36.6 °C (97.9 °F) (Tympanic)   Resp (!) 44   Wt 62.6 kg (138 lb)   SpO2 96%       Assessment/Plan   Connor Gutierres is a 84 y.o. 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      Acute medical conditions:  #Atrial fibrillation with RVR  :: Patient has a history of paroxysmal atrial fibrillation, he takes Eliquis.  :: Cardiology was consulted in the ED, plan is to cardiovert patient tomorrow  -Continue telemetry monitoring, cardiology consulted  -Continue home metoprolol to tartrate 25 mg 2  times a day  -Keep magnesium over 2, potassium over 4    Chronic medical conditions:  #Hypertension  #Dyslipidemia  #COPD  #Peripheral artery disease  #Coronary artery disease  -Continue losartan/hydrochlorothiazide, rosuvastatin, albuterol inhaler, Respimat, metoprolol tartrate, Eliquis, aspirin, nitroglycerin sublingual as needed    DVT prophylaxis on Eliquis  GI prophylaxis none  Diet n.p.o. midnight  CODE STATUS full code    Kalyani Paz MD  PGY1 IM         [1] No family history on file.

## 2025-04-16 NOTE — ED PROVIDER NOTES
HPI   Chief Complaint   Patient presents with    Rapid Heart Rate     Pt coming in for afib. Has been going on for around 16 hours or so. States that he was unable to get his heart rate to go down. Has been able to manage it in the past by just relaxing. Does take eliquis. States that he feels short of breath and can feel that it is in afib. Denies any chest pain.          84-year-old male with significant PMH of paroxysmal atrial fibrillation on Eliquis presenting to the emergency department with his daughter due to rapid heart rate. Patient reports he has noticed his heart rate ranging from 110-130 over the past 18 hours. He monitors his heart rate regularly on his watch. He was advised by his cardiologist Dr. Villa to seek care in the ED if tachycardia persists. In the past, he has been able too relax and self convert himself out the irregular rhythm, although he has needed cardioverted in the past as well. He states his resting heart rate is usually 45-50 bpm. He takes metoprolol 25 mg bid for rate with control. Endorses occasional shortness of breath but endorses a history of COPD. Denies any lightheadedness, dizziness, chest pain, or palpitations. Reports chronic tingling sensations in his legs that has not worsened, denies difficulty with ambulation.              Patient History   Medical History[1]  Surgical History[2]  Family History[3]  Social History[4]    Physical Exam   ED Triage Vitals [04/16/25 1254]   Temperature Heart Rate Respirations BP   36.6 °C (97.9 °F) (!) 116 16 137/85      Pulse Ox Temp Source Heart Rate Source Patient Position   96 % Tympanic Monitor Sitting      BP Location FiO2 (%)     Right arm --       Physical Exam  Vitals and nursing note reviewed.   Constitutional:       Appearance: Normal appearance.   HENT:      Head: Atraumatic.      Nose: Nose normal.      Mouth/Throat:      Mouth: Mucous membranes are moist.   Eyes:      Extraocular Movements: Extraocular movements intact.       Conjunctiva/sclera: Conjunctivae normal.   Cardiovascular:      Rate and Rhythm: Tachycardia present. Rhythm irregular.   Pulmonary:      Effort: Pulmonary effort is normal.      Breath sounds: Normal breath sounds.   Abdominal:      General: Abdomen is flat.      Palpations: Abdomen is soft.      Tenderness: There is no abdominal tenderness.   Musculoskeletal:         General: Normal range of motion.      Cervical back: Neck supple.      Right lower leg: No edema.      Left lower leg: No edema.   Skin:     General: Skin is warm and dry.      Capillary Refill: Capillary refill takes less than 2 seconds.   Neurological:      Mental Status: He is alert and oriented to person, place, and time.   Psychiatric:         Mood and Affect: Mood normal.           ED Course & MDM   Diagnoses as of 04/16/25 2141   Atrial fibrillation with RVR (Multi)                 No data recorded     [place] Coma Scale Score: 15 (04/16/25 1437 : Trina Stephens RN)                           Medical Decision Making  84-year-old male with significant PMH of paroxysmal atrial fibrillation on Eliquis presenting to the emergency department with his daughter due to rapid heart rate. Vital signs reviewed and stable. Heart rate ranging from 110-120. Patient is overall well-appearing and not in any acute distress. He does seem anxious. Lungs are clear bilaterally. Respirations are even and unlabored. EKG showing A-fib RVR, 116, QRS 84, QTc 49.  No acute injury pattern. High sensitivity troponin negative x 2 making ACS unlikely. BNP slightly elevated at 110. Mag WNL. CMP relatively unremarkable. CBC without leukocytosis or anemia. INR 1.7 CXR negative for acute cardiopulmonary process, hyperinflation with flattening of the diaphragms with attenuation if interstitial markings consistent with COPD. Attempted IV lopressor 5 mg Q5 min for rate control without success. Consulted patient's cardiologist, Dr. Villa, who recommended npo at midnight and  cardioversion tomorrow. Patient was agreeable to plan of care and admission. Accepted for overnight telemetry observation admission by Dr. Hahn.        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  [4]   Social History  Tobacco Use    Smoking status: Former     Types: Cigarettes    Smokeless tobacco: Never   Substance Use Topics    Alcohol use: Never    Drug use: Never        Maribeth Walter PA-C  04/16/25 7688

## 2025-04-16 NOTE — PROGRESS NOTES
Pharmacy Medication History Review    Connor Gutierres is a 84 y.o. male admitted for No Principal Problem: There is no principal problem currently on the Problem List. Please update the Problem List and refresh.. Pharmacy reviewed the patient's [id number] medications and allergies for accuracy.    The list below reflectives the updated PTA list. Please review each medication in order reconciliation for additional clarification and justification.  Prior to Admission medications    Medication Sig Start Date End Date Taking? Authorizing Provider   albuterol 90 mcg/actuation inhaler Inhale 2 puffs every 4 hours if needed for shortness of breath. 1/7/25  Yes Berkley Ramirez MD   aspirin 81 mg chewable tablet Chew 1 tablet (81 mg) once daily in the morning. 7/12/22  Yes Historical Provider, MD   cholecalciferol (Vitamin D3) 25 MCG (1000 UT) tablet Take 1 tablet (1,000 Units) by mouth once daily.   Yes Historical Provider, MD   Eliquis 2.5 mg tablet Take 1 tablet (2.5 mg) by mouth 2 times a day.   Yes Historical Provider, MD   losartan-hydrochlorothiazide (Hyzaar) 50-12.5 mg tablet Take 1 tablet by mouth once daily in the morning. 5/4/22  Yes Historical Provider, MD   metoprolol tartrate (Lopressor) 25 mg tablet Take 1 tablet (25 mg) by mouth 2 times a day. 6/19/17  Yes Historical Provider, MD   multivitamin with minerals tablet Take 1 tablet by mouth once daily.   Yes Historical Provider, MD   nitroglycerin (Nitrostat) 0.4 mg SL tablet Place 1 tablet (0.4 mg) under the tongue every 5 minutes if needed for chest pain. 10/16/20  Yes Historical Provider, MD   rosuvastatin (Crestor) 20 mg tablet Take 1 tablet (20 mg) by mouth once daily in the morning. 2/1/22  Yes Historical Provider, MD   tiotropium-olodateroL (Stiolto Respimat) 2.5-2.5 mcg/actuation mist inhaler Inhale 2 Inhalations once daily. 1/7/25  Yes Berkley Ramirez MD   tiZANidine (Zanaflex) 4 mg capsule Take 1 capsule (4 mg) by mouth 3 times a day as needed  for muscle spasms.  Patient not taking: Reported on 4/16/2025 11/22/24 11/22/25 no Berkley Ramirez MD        The list below reflectives the updated allergy list. Please review each documented allergy for additional clarification and justification.  Allergies  Reviewed by Alexander Neil RN on 4/16/2025        Severity Reactions Comments    Penicillins Not Specified Swelling             Below are additional concerns with the patient's PTA list.      Viki Wood

## 2025-04-17 ENCOUNTER — ANESTHESIA (OUTPATIENT)
Dept: CARDIOLOGY | Facility: HOSPITAL | Age: 85
End: 2025-04-17
Payer: COMMERCIAL

## 2025-04-17 ENCOUNTER — HOSPITAL ENCOUNTER (INPATIENT)
Dept: CARDIOLOGY | Facility: HOSPITAL | Age: 85
Discharge: HOME | End: 2025-04-17
Payer: COMMERCIAL

## 2025-04-17 ENCOUNTER — ANESTHESIA EVENT (OUTPATIENT)
Dept: CARDIOLOGY | Facility: HOSPITAL | Age: 85
End: 2025-04-17
Payer: COMMERCIAL

## 2025-04-17 VITALS
RESPIRATION RATE: 15 BRPM | HEIGHT: 70 IN | OXYGEN SATURATION: 100 % | SYSTOLIC BLOOD PRESSURE: 117 MMHG | DIASTOLIC BLOOD PRESSURE: 70 MMHG | TEMPERATURE: 97.7 F | BODY MASS INDEX: 19.76 KG/M2 | HEART RATE: 45 BPM | WEIGHT: 138 LBS

## 2025-04-17 PROBLEM — Z98.62 H/O ANGIOPLASTY: Status: RESOLVED | Noted: 2024-11-25 | Resolved: 2025-04-17

## 2025-04-17 PROBLEM — I73.9 PAD (PERIPHERAL ARTERY DISEASE) (CMS-HCC): Status: RESOLVED | Noted: 2024-11-25 | Resolved: 2025-04-17

## 2025-04-17 PROBLEM — I48.91 ATRIAL FIBRILLATION WITH RVR (MULTI): Status: RESOLVED | Noted: 2025-04-16 | Resolved: 2025-04-17

## 2025-04-17 PROBLEM — J43.9 EMPHYSEMA LUNG (MULTI): Status: RESOLVED | Noted: 2021-10-14 | Resolved: 2025-04-17

## 2025-04-17 PROBLEM — I25.10 CAD (CORONARY ARTERY DISEASE): Status: RESOLVED | Noted: 2024-11-19 | Resolved: 2025-04-17

## 2025-04-17 PROBLEM — I48.0 PAROXYSMAL ATRIAL FIBRILLATION (MULTI): Status: RESOLVED | Noted: 2017-04-10 | Resolved: 2025-04-17

## 2025-04-17 PROBLEM — I10 ESSENTIAL HYPERTENSION: Status: RESOLVED | Noted: 2017-04-10 | Resolved: 2025-04-17

## 2025-04-17 PROBLEM — D50.9 IRON DEFICIENCY ANEMIA: Status: RESOLVED | Noted: 2024-11-19 | Resolved: 2025-04-17

## 2025-04-17 PROBLEM — E78.5 HYPERLIPIDEMIA: Status: RESOLVED | Noted: 2017-08-31 | Resolved: 2025-04-17

## 2025-04-17 PROBLEM — I27.21 PULMONARY ARTERIAL HYPERTENSION (MULTI): Status: RESOLVED | Noted: 2017-04-10 | Resolved: 2025-04-17

## 2025-04-17 PROBLEM — Z95.5 S/P CORONARY ARTERY STENT PLACEMENT: Status: RESOLVED | Noted: 2024-11-25 | Resolved: 2025-04-17

## 2025-04-17 LAB
Q ONSET: 227 MS
QRS COUNT: 19 BEATS
QRS DURATION: 84 MS
QT INTERVAL: 352 MS
QTC CALCULATION(BAZETT): 489 MS
QTC FREDERICIA: 438 MS
R AXIS: 115 DEGREES
T AXIS: 108 DEGREES
T OFFSET: 403 MS
VENTRICULAR RATE: 116 BPM

## 2025-04-17 PROCEDURE — 1200000002 HC GENERAL ROOM WITH TELEMETRY DAILY

## 2025-04-17 PROCEDURE — A92960 PR CARDIOVERSION, ELECTIVE;EXTERN: Performed by: ANESTHESIOLOGY

## 2025-04-17 PROCEDURE — 2500000004 HC RX 250 GENERAL PHARMACY W/ HCPCS (ALT 636 FOR OP/ED): Performed by: NURSE ANESTHETIST, CERTIFIED REGISTERED

## 2025-04-17 PROCEDURE — 7100000009 HC PHASE TWO TIME - INITIAL BASE CHARGE: Performed by: INTERNAL MEDICINE

## 2025-04-17 PROCEDURE — 2500000002 HC RX 250 W HCPCS SELF ADMINISTERED DRUGS (ALT 637 FOR MEDICARE OP, ALT 636 FOR OP/ED)

## 2025-04-17 PROCEDURE — 3700000001 HC GENERAL ANESTHESIA TIME - INITIAL BASE CHARGE: Performed by: INTERNAL MEDICINE

## 2025-04-17 PROCEDURE — 2500000001 HC RX 250 WO HCPCS SELF ADMINISTERED DRUGS (ALT 637 FOR MEDICARE OP)

## 2025-04-17 PROCEDURE — 92960 CARDIOVERSION ELECTRIC EXT: CPT | Performed by: INTERNAL MEDICINE

## 2025-04-17 PROCEDURE — 93005 ELECTROCARDIOGRAM TRACING: CPT

## 2025-04-17 PROCEDURE — 7100000010 HC PHASE TWO TIME - EACH INCREMENTAL 1 MINUTE: Performed by: INTERNAL MEDICINE

## 2025-04-17 PROCEDURE — G0378 HOSPITAL OBSERVATION PER HR: HCPCS

## 2025-04-17 PROCEDURE — 2720000007 HC OR 272 NO HCPCS: Performed by: INTERNAL MEDICINE

## 2025-04-17 PROCEDURE — A92960 PR CARDIOVERSION, ELECTIVE;EXTERN: Performed by: NURSE ANESTHETIST, CERTIFIED REGISTERED

## 2025-04-17 PROCEDURE — 3700000002 HC GENERAL ANESTHESIA TIME - EACH INCREMENTAL 1 MINUTE: Performed by: INTERNAL MEDICINE

## 2025-04-17 PROCEDURE — 99100 ANES PT EXTEME AGE<1 YR&>70: CPT | Performed by: ANESTHESIOLOGY

## 2025-04-17 RX ORDER — PROPOFOL 10 MG/ML
INJECTION, EMULSION INTRAVENOUS AS NEEDED
Status: DISCONTINUED | OUTPATIENT
Start: 2025-04-17 | End: 2025-04-17

## 2025-04-17 RX ADMIN — ASPIRIN 81 MG CHEWABLE TABLET 81 MG: 81 TABLET CHEWABLE at 09:52

## 2025-04-17 RX ADMIN — Medication 25 MCG: at 09:49

## 2025-04-17 RX ADMIN — APIXABAN 2.5 MG: 5 TABLET, FILM COATED ORAL at 09:50

## 2025-04-17 RX ADMIN — ROSUVASTATIN CALCIUM 20 MG: 10 TABLET, FILM COATED ORAL at 09:49

## 2025-04-17 RX ADMIN — METOPROLOL TARTRATE 25 MG: 25 TABLET, FILM COATED ORAL at 09:52

## 2025-04-17 RX ADMIN — LOSARTAN POTASSIUM: 50 TABLET, FILM COATED ORAL at 09:51

## 2025-04-17 RX ADMIN — PROPOFOL 70 MG: 10 INJECTION, EMULSION INTRAVENOUS at 07:36

## 2025-04-17 SDOH — HEALTH STABILITY: MENTAL HEALTH: CURRENT SMOKER: 0

## 2025-04-17 ASSESSMENT — COGNITIVE AND FUNCTIONAL STATUS - GENERAL
DAILY ACTIVITIY SCORE: 24
MOBILITY SCORE: 24

## 2025-04-17 ASSESSMENT — PAIN SCALES - GENERAL
PAIN_LEVEL: 0
PAINLEVEL_OUTOF10: 0 - NO PAIN

## 2025-04-17 NOTE — PROGRESS NOTES
Physical Therapy    Discharge Summary    Name: Connor Gutierres  MRN: [id number]  : 1940  Date: 25    Discharge Summary: PT    Discharge Reason: Pt discharging, dressed, and ambulating independently. No PT needs required.

## 2025-04-17 NOTE — NURSING NOTE
Report given to Elizabeth PADILLA, patients have no complaint of nausea or pain. Iv patent. VS stable for transfer.

## 2025-04-17 NOTE — ANESTHESIA POSTPROCEDURE EVALUATION
Patient: Connor Gutierres    Procedure Summary       Date: 04/17/25 Room / Location: [facility] CATH LAB 2 / Virtual [facility] Cardiac Cath Lab    Anesthesia Start: 0728 Anesthesia Stop: 0742    Procedure: Cardioversion Diagnosis: Atrial fibrillation with RVR (Multi)    Providers: Bigg Villa MD Responsible Provider: Darryl Sharif MD    Anesthesia Type: MAC ASA Status: 3            Anesthesia Type: MAC    Vitals Value Taken Time   /88 04/17/25 07:42   Temp 36.5 04/17/25 07:42   Pulse 50 04/17/25 07:42   Resp 14 04/17/25 07:42   SpO2 99 % 04/17/25 07:28       Anesthesia Post Evaluation    Patient location during evaluation: bedside  Patient participation: complete - patient participated  Level of consciousness: awake  Pain score: 0  Pain management: adequate  Airway patency: patent  Cardiovascular status: acceptable  Respiratory status: acceptable  Hydration status: acceptable  Postoperative Nausea and Vomiting: none        There were no known notable events for this encounter.

## 2025-04-17 NOTE — POST-PROCEDURE NOTE
Physician Transition of Care Summary  Invasive Cardiovascular Lab    Procedure Date: 4/17/2025  Attending:    * Bigg Villa - Primary  Resident/Fellow/Other Assistant: Surgeons and Role:  * No surgeons found with a matching role *    Indications:   Pre-op Diagnosis      * Atrial fibrillation with RVR (Multi) [I48.91]    Post-procedure diagnosis:   Post-op Diagnosis     * Atrial fibrillation with RVR (Multi) [I48.91]    Procedure(s):   Cardioversion  85083 - ND CARDIOVERSION ELECTIVE ARRHYTHMIA EXTERNAL        Procedure Findings:   Paroxysmal atrial fibrillation    Description of the Procedure:   200 J, synchronized biphasic energy.  Sinus rhythm restored.    Complications:   None    Stents/Implants:   Implants       No implant documentation for this case.            Anticoagulation/Antiplatelet Plan:   Eliquis    Estimated Blood Loss:   * No values recorded between 4/17/2025  7:27 AM and 4/17/2025  7:39 AM *    Anesthesia: General Anesthesia Staff: Anesthesiologist: Darryl Sharif MD  CRNA: ANSLEY Grissom-CRNA    Any Specimen(s) Removed:   No specimens collected during this procedure.    Disposition:   Okay for discharge      Electronically signed by: Bigg Villa MD, 4/17/2025 7:39 AM

## 2025-04-17 NOTE — PROGRESS NOTES
04/17/25 1234   Discharge Planning   Living Arrangements Spouse/significant other   Support Systems Spouse/significant other   Assistance Needed None   Type of Residence Private residence   Who is requesting discharge planning? Patient   Home or Post Acute Services None   Expected Discharge Disposition Home     TCC Note: Pt is an Extended Recovery pt who was admitted for Cardioversion. Procedure was successful and spouse will transport home today. Pt has no homegoing needs. Will continue to follow until discharge. Gillian Kraft, MSN, RN, TCC.

## 2025-04-17 NOTE — CARE PLAN
The patient's goals for the shift include rest    The clinical goals for the shift include Patient's HR will remain under 130s by the end of the shift

## 2025-04-17 NOTE — DISCHARGE SUMMARY
Cardiology discharge summary    Discharge diagnoses:  Paroxysmal atrial fibrillation.  [id number] 4. S/p CVN  CAD.  MI 2017.  Status post PCI RCA.  No angina.  PAD.  2022 PTA right lower extremity.  COPD  Hypertension  Hyperlipidemia  Hospital course:  Admitted w symptomatic AF. Cardioverted.   Follow-up:  2-3 week w Dr. mora       Your medication list        CONTINUE taking these medications        Instructions Last Dose Given Next Dose Due   albuterol 90 mcg/actuation inhaler      Inhale 2 puffs every 4 hours if needed for shortness of breath.       aspirin 81 mg chewable tablet           Eliquis 2.5 mg tablet  Generic drug: apixaban           losartan-hydrochlorothiazide 50-12.5 mg tablet  Commonly known as: Hyzaar           metoprolol tartrate 25 mg tablet  Commonly known as: Lopressor           multivitamin with minerals tablet           nitroglycerin 0.4 mg SL tablet  Commonly known as: Nitrostat           rosuvastatin 20 mg tablet  Commonly known as: Crestor           Stiolto Respimat 2.5-2.5 mcg/actuation mist inhaler  Generic drug: tiotropium-olodateroL      Inhale 2 Inhalations once daily.       Vitamin D3 25 mcg (1,000 units) tablet  Generic drug: cholecalciferol                  ASK your doctor about these medications        Instructions Last Dose Given Next Dose Due   tiZANidine 4 mg capsule  Commonly known as: Zanaflex      Take 1 capsule (4 mg) by mouth 3 times a day as needed for muscle spasms.

## 2025-04-17 NOTE — PROGRESS NOTES
Occupational Therapy                 Therapy Communication Note    Patient Name: Connor Gutierres  MRN: [id number]  Department:   Room: [address] [place] [address]  Today's Date: 4/17/2025     Discipline: Occupational Therapy    OT Missed Visit: Yes     Missed Visit Reason: Missed Visit Reason:  (pt. observed in the [place] with family, independent with adl/ambulation, no skilled needs, discharge ot)    Missed Time: Attempt    Comment:

## 2025-04-17 NOTE — CONSULTS
Cardiology Consult    Impression:  Paroxysmal atrial fibrillation.  [id number] 4  CAD.  MI 2017.  Status post PCI RCA.  No angina.  PAD.  2022 PTA right lower extremity.  COPD  Hypertension  Hyperlipidemia  Plan:  Electrical cardioversion  CC  Palpitations  HPI:  84-year-old man came to ER for evaluation of palpitations.  Woke up early yesterday morning with rapid irregular heart action.  Heart rate was in the 120s.  No chest pain.  Breathing okay.  Symptoms persisted so he came to ER.  EKG confirmed A-fib.  First episode of A-fib since he was cardioverted in 2023.  No recent illnesses or changes in medications.  Meds:  Scheduled medications  Scheduled Medications[1]  Continuous medications  Continuous Medications[2]  PRN medications  PRN Medications[3]    PMHx:  AF  CAD  PAD  COPD  Hypertension  Hyperlipidemia  Social history:  Lives in the community.  Ex-smoker.  No alcohol.  Family history:  Noncontributory  Review of systems:  See HPI  Physical exam:  Vitals:    04/17/25 0256   BP: 117/70   Pulse: 103   Resp:    Temp: 36.5 °C (97.7 °F)   SpO2: 95%      JVP not elevated. Carotid upstroke normal. No carotid bruits. Heart sounds normal. No extra sounds or murmurs. Chest clear. Abdomen soft, nontender. No masses. Peripheral pulses palpable. No edema.  EKG:  Atrial fibrillation  Echo:  2021: Normal EF.  Labs:  Lab Results   Component Value Date    WBC 6.9 04/16/2025    HGB 13.7 04/16/2025    HCT 42.1 04/16/2025     04/16/2025    CHOL 105 03/25/2024    TRIG 87 03/25/2024    HDL 50.7 03/25/2024    ALT 28 04/16/2025    AST 36 04/16/2025     04/16/2025    K 4.2 04/16/2025    CL 99 04/16/2025    CREATININE 1.08 04/16/2025    BUN 36 (H) 04/16/2025    CO2 28 04/16/2025    TSH 1.19 11/20/2024    INR 1.7 (H) 04/16/2025     par        [1] apixaban, 2.5 mg, oral, BID  aspirin, 81 mg, oral, q AM  cholecalciferol, 25 mcg, oral, Daily  tiotropium, 2 puff, inhalation, Daily   And  formoterol, 20 mcg, nebulization,  q12h  losartan 50 mg, hydroCHLOROthiazide 12.5 mg for Hyzaar 50 mg-12.5 mg, , oral, Daily  metoprolol tartrate, 25 mg, oral, BID  polyethylene glycol, 17 g, oral, Daily  rosuvastatin, 20 mg, oral, Daily  [2]    [3] PRN medications: acetaminophen **OR** acetaminophen **OR** acetaminophen, albuterol, dextromethorphan-guaifenesin, dextrose, dextrose, glucagon, glucagon, nitroglycerin, ondansetron **OR** ondansetron, ondansetron **OR** ondansetron

## 2025-04-17 NOTE — NURSING NOTE
Discharge instructions reviewed with patient who voiced understanding of all instructions.  Discharged to home with all belongings.

## 2025-04-17 NOTE — ANESTHESIA PREPROCEDURE EVALUATION
Patient: Connor Gutierres    Procedure Information       Date/Time: 04/17/25 0730    Procedure: Cardioversion    Location: [facility] / [facility]    Providers: Bigg Villa MD            Relevant Problems   Cardiac   (+) Atrial fibrillation with RVR (Multi)   (+) CAD (coronary artery disease)   (+) Essential hypertension   (+) Hyperlipidemia   (+) PAD (peripheral artery disease) (CMS-HCC)   (+) Paroxysmal atrial fibrillation (Multi)      Pulmonary   (+) Emphysema lung (Multi)   (+) Pulmonary arterial hypertension (Multi)      Hematology   (+) Iron deficiency anemia       Clinical information reviewed:    Allergies  Meds  Problems              NPO Detail:  No data recorded     Physical Exam    Airway  Mallampati: II  TM distance: >3 FB  Neck ROM: full  Mouth opening: 3 or more finger widths     Cardiovascular - normal exam  Rhythm: regular  Rate: normal     Dental - normal exam    (+) upper dentures, lower dentures     Pulmonary - normal exam   Abdominal            Anesthesia Plan    History of general anesthesia?: yes  History of complications of general anesthesia?: no    ASA 3     MAC     The patient is not a current smoker.    intravenous induction   Anesthetic plan and risks discussed with patient.    Plan discussed with CRNA.

## 2025-04-18 ENCOUNTER — PATIENT OUTREACH (OUTPATIENT)
Dept: PRIMARY CARE | Facility: CLINIC | Age: 85
End: 2025-04-18
Payer: COMMERCIAL

## 2025-04-18 LAB
ATRIAL RATE: 55 BPM
P AXIS: 85 DEGREES
P OFFSET: 197 MS
P ONSET: 133 MS
PR INTERVAL: 184 MS
Q ONSET: 225 MS
QRS COUNT: 9 BEATS
QRS DURATION: 88 MS
QT INTERVAL: 468 MS
QTC CALCULATION(BAZETT): 447 MS
QTC FREDERICIA: 454 MS
R AXIS: 34 DEGREES
T AXIS: 59 DEGREES
T OFFSET: 459 MS
VENTRICULAR RATE: 55 BPM

## 2025-04-18 NOTE — PROGRESS NOTES
Discharge Facility:  [facility]     Discharge Diagnosis:  S/p cardioversion   Paroxysmal atrial fibrillation   Admission Date:  4/16/25  Discharge Date:   4/17/25 -home self care    PCP Appointment Date:  Appointment with Berkley Ramirez MD (05/19/2025) -pt would like to keep his appt as may 19th and declined to move up appt for hospital follow up.   Specialist Appointment Date:   2-3 week w Dr. villa -May 7th -pt calling to make sure this appt is good with doctor(set prior)   Hospital Encounter and Summary Linked: Yes  ED to Hosp-Admission (Discharged) with Bigg Villa MD; Walter Choi MD (04/16/2025)     Discharge Summary by Bigg Villa MD (04/17/2025 10:40)   See discharge assessment below for further details  Wrap Up  Wrap Up Additional Comments: Successful transition of care outreach within 2 business days of discharge. CM introduced myself and the TCM program.   CM gave my contact information and encouraged to call if needing assistance or has any further non-emergent questions prior to my next outreach.   Reviewed hospital stay and answered any questions. Pt stated he feels good , happy about how things turned out. Heart rate seems to be a little higher then it was say last week before this all started but not jumping around and staying between 60's to 70's. Pt denies being dizzy, lightheaded, palpitations, shortness of breath or chest pain. He already had an appt set with Dr Villa(cardio) for may 7th but is going to call the office to see if they would like to move up appt. He declined my offer to make sooner appt with Dr. De Anda. He would like to see cardio first and then follow up with her. Pt aware I will be sending records to PCP for review.  Patient denies any further discharge questions/needs at this time. (4/18/2025 10:07 AM)    Medications  Medications reviewed with patient/caregiver?: Yes (4/18/2025 10:07 AM)  Is the patient having any side effects they believe may be caused by  any medication additions or changes?: No (4/18/2025 10:07 AM)  Does the patient have all medications ordered at discharge?: Not applicable (4/18/2025 10:07 AM)  Prescription Comments: no medication changes at discharge (4/18/2025 10:07 AM)  Is the patient taking all medications as directed (includes completed medication regime)?: Yes (4/18/2025 10:07 AM)  Medication Comments: Patient endorses understanding & compliance with medications. (4/18/2025 10:07 AM)    Appointments  Does the patient have a primary care provider?: Yes (4/18/2025 10:07 AM)  Care Management Interventions: Verified appointment date/time/provider (4/18/2025 10:07 AM)  Has the patient kept scheduled appointments due by today?: Yes (4/18/2025 10:07 AM)  Care Management Interventions: Advised to reschedule appointment (4/18/2025 10:07 AM)    Self Management  Has home health visited the patient within 72 hours of discharge?: Not applicable (4/18/2025 10:07 AM)  What Durable Medical Equipment (DME) was ordered?: na (4/18/2025 10:07 AM)    Patient Teaching  Does the patient have access to their discharge instructions?: Yes (4/18/2025 10:07 AM)  Care Management Interventions: Reviewed instructions with patient; Educated on MyChart (Active MyChart) (4/18/2025 10:07 AM)  What is the patient's perception of their health status since discharge?: Same (4/18/2025 10:07 AM)  Is the patient/caregiver able to teach back the hierarchy of who to call/visit for symptoms/problems? PCP, Specialist, Home Health nurse, Urgent Care, ED, 911: Yes (4/18/2025 10:07 AM)

## 2025-05-06 ENCOUNTER — PATIENT OUTREACH (OUTPATIENT)
Dept: PRIMARY CARE | Facility: CLINIC | Age: 85
End: 2025-05-06
Payer: COMMERCIAL

## 2025-05-06 NOTE — PROGRESS NOTES
Unable to reach patient for follow up call after recent hospitalization.   Left voicemail with call back number for patient to call if needed to assist with any questions or concerns patient may have.

## 2025-05-19 ENCOUNTER — APPOINTMENT (OUTPATIENT)
Dept: PRIMARY CARE | Facility: CLINIC | Age: 85
End: 2025-05-19
Payer: COMMERCIAL

## (undated) DEVICE — ELECTRODE, MULTIFUNCTION, QUICK-COMBO, EDGE SYSTEM, 2 FT